# Patient Record
Sex: MALE | Race: WHITE | NOT HISPANIC OR LATINO | Employment: OTHER | URBAN - NONMETROPOLITAN AREA
[De-identification: names, ages, dates, MRNs, and addresses within clinical notes are randomized per-mention and may not be internally consistent; named-entity substitution may affect disease eponyms.]

---

## 2017-04-04 ENCOUNTER — OFFICE VISIT (OUTPATIENT)
Dept: CHIROPRACTIC MEDICINE | Facility: OTHER | Age: 72
End: 2017-04-04
Attending: CHIROPRACTOR
Payer: MEDICARE

## 2017-04-04 DIAGNOSIS — M99.02 SEGMENTAL AND SOMATIC DYSFUNCTION OF THORACIC REGION: ICD-10-CM

## 2017-04-04 DIAGNOSIS — M99.03 SEGMENTAL AND SOMATIC DYSFUNCTION OF LUMBAR REGION: Primary | ICD-10-CM

## 2017-04-04 DIAGNOSIS — M54.41 ACUTE RIGHT-SIDED LOW BACK PAIN WITH RIGHT-SIDED SCIATICA: ICD-10-CM

## 2017-04-04 DIAGNOSIS — M99.01 SEGMENTAL AND SOMATIC DYSFUNCTION OF CERVICAL REGION: ICD-10-CM

## 2017-04-04 PROCEDURE — 98941 CHIROPRACT MANJ 3-4 REGIONS: CPT | Mod: AT | Performed by: CHIROPRACTOR

## 2017-04-04 NOTE — MR AVS SNAPSHOT
"              After Visit Summary   4/4/2017    Nir Monson    MRN: [id number]           Patient Information     Date Of Birth          1945        Visit Information        Provider Department      4/4/2017 10:10 AM Antonio Briggs [facility]  [facility]        Today's Diagnoses     Segmental and somatic dysfunction of lumbar region    -  1    Acute right-sided low back pain with right-sided sciatica        Segmental and somatic dysfunction of thoracic region        Segmental and somatic dysfunction of cervical region           Follow-ups after your visit        Who to contact     If you have questions or need follow up information about today's clinic visit or your schedule please contact  [facility] directly at [phone].  Normal or non-critical lab and imaging results will be communicated to you by Channelinsighthart, letter or phone within 4 business days after the clinic has received the results. If you do not hear from us within 7 days, please contact the clinic through Channelinsighthart or phone. If you have a critical or abnormal lab result, we will notify you by phone as soon as possible.  Submit refill requests through [employer] or call your pharmacy and they will forward the refill request to us. Please allow 3 business days for your refill to be completed.          Additional Information About Your Visit        MyChart Information     [employer] lets you send messages to your doctor, view your test results, renew your prescriptions, schedule appointments and more. To sign up, go to [email or web address] . Click on \"Log in\" on the left side of the screen, which will take you to the Welcome page. Then click on \"Sign up Now\" on the right side of the page.     You will be asked to enter the access code listed below, as well as some personal information. Please follow the directions to create your username and password.     Your access code is: [email or web address]  Expires: 7/4/2017 10:12 AM     Your access code " will  in 90 days. If you need help or a new code, please call your [place] clinic or [phone].        Care EveryWhere ID     This is your Care EveryWhere ID. This could be used by other organizations to access your [place] medical records  [id number]         Blood Pressure from Last 3 Encounters:   16 140/78    Weight from Last 3 Encounters:   16 180 lb (81.6 kg)              We Performed the Following     CHIROPRAC MANIP,SPINAL,3-4 REGIONS        Primary Care Provider Office Phone # Fax #    Kitty Bell [phone]       [facility] [address] [facility] [address]        Thank you!     Thank you for choosing  [facility]  for your care. Our goal is always to provide you with excellent care. Hearing back from our patients is one way we can continue to improve our services. Please take a few minutes to complete the written survey that you may receive in the mail after your visit with us. Thank you!             Your Updated Medication List - Protect others around you: Learn how to safely use, store and throw away your medicines at www.disposemymeds.org.          This list is accurate as of: 17 11:59 PM.  Always use your most recent med list.                   Brand Name Dispense Instructions for use    LIPITOR PO      Take 10 mg by mouth daily       LISINOPRIL PO      Take 10 mg by mouth daily

## 2017-04-05 NOTE — PROGRESS NOTES
Subjective Finding:    Chief compalint: Patient presents with:  Back Pain: right sided  Neck Pain: left sided  , Pain Scale: 5/10, Intensity: sharp, Duration: 6 months, Radiating: right leg.    Date of injury:     Activities that the pain restricts:   Home/household/hobbies/social activities: yes.  Work duties: no.  Sleep: no.  Makes symptoms better: rest.  Makes symptoms worse: activity and golf.  Have you seen anyone else for the symptoms? No.  Work related: no.  Automobile related injury: no.    Objective and Assessment:    Posture Analysis:   High shoulder: .  Head tilt: .  High iliac crest: right.  Head carriage: forward.  Thoracic Kyphosis: neutral.  Lumbar Lordosis: neutral.    Lumbar Range of Motion: extension decreased and right lateral flexion decreased.  Cervical Range of Motion: extension decreased.  Thoracic Range of Motion: extension decreased.  Extremity Range of Motion: .    Palpation:   Quad lumb: right, referred pain: no  Lev scapulae: sharp pain, referred pain: no    Segmental dysfunction pre-treatment and treatment area: C6, C7, T4, L4 and L5.    Assessment post-treatment:  Cervical: ROM increased.  Thoracic: ROM increased.  Lumbar: ROM increased.    Comments: .      Complicating Factors: .    Procedure(s):  CMT:  79371 Chiropractic manipulative treatment 3-4 regions performed   Cervical: Diversified, See above for level, Supine, Thoracic: Diversified, See above for level, Prone and Lumbar: Diversified, See above for level, Side posture    Modalities:  None performed this visit    Therapeutic procedures:  None    Plan:  Treatment plan: PRN.  Instructed patient: stretch as instructed at visit.  Short term goals: increase ROM.  Long term goals: restore normal function.  Prognosis: very good.

## 2017-04-10 ENCOUNTER — OFFICE VISIT (OUTPATIENT)
Dept: CHIROPRACTIC MEDICINE | Facility: OTHER | Age: 72
End: 2017-04-10
Attending: CHIROPRACTOR
Payer: MEDICARE

## 2017-04-10 DIAGNOSIS — M54.41 ACUTE RIGHT-SIDED LOW BACK PAIN WITH RIGHT-SIDED SCIATICA: ICD-10-CM

## 2017-04-10 DIAGNOSIS — M99.03 SEGMENTAL AND SOMATIC DYSFUNCTION OF LUMBAR REGION: Primary | ICD-10-CM

## 2017-04-10 DIAGNOSIS — M99.02 SEGMENTAL AND SOMATIC DYSFUNCTION OF THORACIC REGION: ICD-10-CM

## 2017-04-10 PROCEDURE — 98940 CHIROPRACT MANJ 1-2 REGIONS: CPT | Mod: AT | Performed by: CHIROPRACTOR

## 2017-04-10 NOTE — MR AVS SNAPSHOT
"              After Visit Summary   4/10/2017    Nir Monson    MRN: [id number]           Patient Information     Date Of Birth          1945        Visit Information        Provider Department      4/10/2017 2:00 PM Antonio Briggs DC  [facility]        Today's Diagnoses     Segmental and somatic dysfunction of lumbar region    -  1    Acute right-sided low back pain with right-sided sciatica        Segmental and somatic dysfunction of thoracic region           Follow-ups after your visit        Who to contact     If you have questions or need follow up information about today's clinic visit or your schedule please contact  [facility] directly at [phone].  Normal or non-critical lab and imaging results will be communicated to you by Dialogfeedhart, letter or phone within 4 business days after the clinic has received the results. If you do not hear from us within 7 days, please contact the clinic through Dialogfeedhart or phone. If you have a critical or abnormal lab result, we will notify you by phone as soon as possible.  Submit refill requests through [employer] or call your pharmacy and they will forward the refill request to us. Please allow 3 business days for your refill to be completed.          Additional Information About Your Visit        MyChart Information     [employer] lets you send messages to your doctor, view your test results, renew your prescriptions, schedule appointments and more. To sign up, go to [email or web address] . Click on \"Log in\" on the left side of the screen, which will take you to the Welcome page. Then click on \"Sign up Now\" on the right side of the page.     You will be asked to enter the access code listed below, as well as some personal information. Please follow the directions to create your username and password.     Your access code is: [email or web address]  Expires: 2017 10:12 AM     Your access code will  in 90 days. If you need help or a new code, " please call your [place] clinic or [phone].        Care EveryWhere ID     This is your Care EveryWhere ID. This could be used by other organizations to access your [place] medical records  [id number]         Blood Pressure from Last 3 Encounters:   06/22/16 140/78    Weight from Last 3 Encounters:   06/22/16 180 lb (81.6 kg)              We Performed the Following     CHIROPRAC MANIP,SPINAL,1-2 REGIONS        Primary Care Provider Office Phone # Fax #    Kitty Bell [phone]       [facility] [address] [facility] [address]        Thank you!     Thank you for choosing  [facility]  for your care. Our goal is always to provide you with excellent care. Hearing back from our patients is one way we can continue to improve our services. Please take a few minutes to complete the written survey that you may receive in the mail after your visit with us. Thank you!             Your Updated Medication List - Protect others around you: Learn how to safely use, store and throw away your medicines at www.disposemymeds.org.          This list is accurate as of: 4/10/17 11:59 PM.  Always use your most recent med list.                   Brand Name Dispense Instructions for use    LIPITOR PO      Take 10 mg by mouth daily       LISINOPRIL PO      Take 10 mg by mouth daily

## 2017-04-13 NOTE — PROGRESS NOTES
Subjective Finding:    Chief compalint: Patient presents with:  Back Pain: right leg pain  , Pain Scale: 5/10, Intensity: sharp, Duration: 6 months, Radiating: right leg.    Date of injury:     Activities that the pain restricts:   Home/household/hobbies/social activities: yes.  Work duties: no.  Sleep: no.  Makes symptoms better: rest.  Makes symptoms worse: activity and golf.  Have you seen anyone else for the symptoms? No.  Work related: no.  Automobile related injury: no.    Objective and Assessment:    Posture Analysis:   High shoulder: .  Head tilt: .  High iliac crest: right.  Head carriage: forward.  Thoracic Kyphosis: neutral.  Lumbar Lordosis: neutral.    Lumbar Range of Motion: extension decreased and right lateral flexion decreased.  Cervical Range of Motion: extension decreased.  Thoracic Range of Motion: extension decreased.  Extremity Range of Motion: .    Palpation:   Quad lumb: right, referred pain: no  Lev scapulae: sharp pain, referred pain: no    Segmental dysfunction pre-treatment and treatment area: T5  L4-5.    Assessment post-treatment:  Cervical: ROM increased.  Thoracic: ROM increased.  Lumbar: ROM increased.    Comments: .      Complicating Factors: .    Procedure(s):  CMT:  27576 Chiropractic manipulative treatment 3-4 regions performed   Cervical: Diversified, See above for level, Supine, Thoracic: Diversified, See above for level, Prone and Lumbar: Diversified, See above for level, Side posture    Modalities:  None performed this visit    Therapeutic procedures:  None    Plan:  Treatment plan: PRN.  Instructed patient: stretch as instructed at visit.  Short term goals: increase ROM.  Long term goals: restore normal function.  Prognosis: very good.

## 2017-07-26 ENCOUNTER — TRANSFERRED RECORDS (OUTPATIENT)
Dept: HEALTH INFORMATION MANAGEMENT | Facility: HOSPITAL | Age: 72
End: 2017-07-26

## 2017-07-26 DIAGNOSIS — M48.062 SPINAL STENOSIS OF LUMBAR REGION WITH NEUROGENIC CLAUDICATION: Primary | ICD-10-CM

## 2017-07-26 DIAGNOSIS — M48.00 SPINAL STENOSIS: Primary | ICD-10-CM

## 2017-08-02 ENCOUNTER — HOSPITAL ENCOUNTER (OUTPATIENT)
Dept: CT IMAGING | Facility: HOSPITAL | Age: 72
End: 2017-08-02
Attending: FAMILY MEDICINE
Payer: MEDICARE

## 2017-08-02 ENCOUNTER — HOSPITAL ENCOUNTER (OUTPATIENT)
Facility: HOSPITAL | Age: 72
Discharge: HOME | End: 2017-08-02
Attending: FAMILY MEDICINE | Admitting: FAMILY MEDICINE
Payer: MEDICARE

## 2017-08-02 ENCOUNTER — SURGERY (OUTPATIENT)
Age: 72
End: 2017-08-02

## 2017-08-02 ENCOUNTER — HOSPITAL ENCOUNTER (OUTPATIENT)
Dept: INTERVENTIONAL RADIOLOGY/VASCULAR | Facility: HOSPITAL | Age: 72
End: 2017-08-02
Attending: FAMILY MEDICINE
Payer: MEDICARE

## 2017-08-02 VITALS
RESPIRATION RATE: 16 BRPM | DIASTOLIC BLOOD PRESSURE: 72 MMHG | SYSTOLIC BLOOD PRESSURE: 118 MMHG | OXYGEN SATURATION: 100 %

## 2017-08-02 PROCEDURE — 71000027 ZZH RECOVERY PHASE 2 EACH 15 MINS

## 2017-08-02 PROCEDURE — 25000125 ZZHC RX 250: Performed by: RADIOLOGY

## 2017-08-02 PROCEDURE — 72131 CT LUMBAR SPINE W/O DYE: CPT | Mod: TC

## 2017-08-02 PROCEDURE — 62304 MYELOGRAPHY LUMBAR INJECTION: CPT | Mod: TC

## 2017-08-02 RX ORDER — LIDOCAINE HYDROCHLORIDE 10 MG/ML
INJECTION, SOLUTION EPIDURAL; INFILTRATION; INTRACAUDAL; PERINEURAL
Status: DISPENSED
Start: 2017-08-02 | End: 2017-08-02

## 2017-08-02 RX ORDER — IOPAMIDOL 408 MG/ML
10 INJECTION, SOLUTION INTRATHECAL ONCE
Status: COMPLETED | OUTPATIENT
Start: 2017-08-02 | End: 2017-08-02

## 2017-08-02 RX ADMIN — LIDOCAINE HYDROCHLORIDE 3 ML: 10 INJECTION, SOLUTION EPIDURAL; INFILTRATION; INTRACAUDAL; PERINEURAL at 09:10

## 2017-08-02 RX ADMIN — IOPAMIDOL 10 ML: 408 INJECTION, SOLUTION INTRATHECAL at 09:11

## 2017-08-02 NOTE — IP AVS SNAPSHOT
MRN:[id number]                      After Visit Summary   8/2/2017    Nir Monson    MRN: [id number]           Thank you!     Thank you for choosing [place] for your care. Our goal is always to provide you with excellent care. Hearing back from our patients is one way we can continue to improve our services. Please take a few minutes to complete the written survey that you may receive in the mail after you visit with us. Thank you!        Patient Information     Date Of Birth          1945        About your hospital stay     You were admitted on:  August 2, 2017 You last received care in the:  [facility] Preop/Phase II    You were discharged on:  August 2, 2017       Who to Call     For medical emergencies, please call 911.  For non-urgent questions about your medical care, please call your primary care provider or clinic, [phone]  For questions related to your surgery, please call your surgery clinic        Attending Provider     Provider Specialty    Kitty Bell MD Family Practice       Primary Care Provider Office Phone # Fax #    Kitty Bell [phone]      Your next 10 appointments already scheduled     Aug 15, 2017  9:30 AM CDT   Pulmonary Function with [facility] PULMONARY LAB   [facility] Respiratory Therapy ([facility] )    [address] [place] [id number] [phone]           No Inhalers for 6 hours prior to test No Smoking 2 hours prior to test              Further instructions from your care team             DISCHARGE INSTRUCTIONS  Myelogram      IMPORTANT: As you prepare for discharge, the following information will help you return to your best level of health.               This Information Is About Your Follow Up Care  Call your doctor if you do not get better. Call sooner if you feel worse. You can reach your doctor by calling their clinic phone number.                                    This Information Is About  Procedures    MYELOGRAM.  In this procedure, a dye was injected through a hollow needle into the space around your spinal cord. An X-ray was then taken to see if you had any spinal problems. This will help diagnose the cause of your back pain.    Do the following:    Drink 8-10 glasses of fluids for the next 2 days unless your doctor has limited your fluids.    Slowly return to your normal activity.    Remain in bed with your head elevated 30 degrees until the following morning to prevent headaches from occuring.    No strenuous activity, heavy lifting or bending for the next 24 hours after your myelogram.      The following day you may resume your regular work/activity schedule if you feel alright.    You need someone to drive you home and stay with you for 24 hours.    Contact your doctor for your test results in 1 to 2 days.    Call your doctor if you have:    a severe headache.    any trouble moving your legs or walking.    any new problems or concerns.    fever and chills    nausea or vomiting                        This Information Is About Your Illness and Diagnosis    GENERAL BACK PAIN.  The back is prone to injury. Back pain can be caused by strains and injuries. It can involve the muscles, ligaments or bones. Today's exam did not show any obvious sign of bone (spine) or nerve damage.    Follow these instructions:    Rest more than usual for the next few days.    Apply a heating pad to your back for no longer than 30 minutes three to four times a day.    Take pain medicine as prescribed by the doctor.    Once the pain has lessened, resume your normal activities.    Do not lift heavy objects until the pain is gone.    Avoid any activity that causes pain.    Call your doctor if you:    have numbness, weakness or tingling in their fingers, arms or legs.    are not completely recovered in 2 weeks.    have any new or severe symptoms.                                                                                  "                           Pending Results     No orders found from 2017 to 8/3/2017.            Admission Information     Date & Time Provider Department Dept. Phone    2017 Kitty Bell MD HI Preop/Phase -[phone]      NanoSight Information     [employer] lets you send messages to your doctor, view your test results, renew your prescriptions, schedule appointments and more. To sign up, go to [email or web address] [facility] [employer] . Click on \"Log in\" on the left side of the screen, which will take you to the Welcome page. Then click on \"Sign up Now\" on the right side of the page.     You will be asked to enter the access code listed below, as well as some personal information. Please follow the directions to create your username and password.     Your access code is: [email or web address]  Expires: 10/31/2017  9:38 AM     Your access code will  in 90 days. If you need help or a new code, please call your [place] clinic or [phone].        Care EveryWhere ID     This is your Care EveryWhere ID. This could be used by other organizations to access your [place] medical records  [id number]        Equal Access to Services     ROBERTO ASHER AH: Mandy langleyo Sodustin, waaxda lualladaha, qaybta kaalmada adejeronimoyada, logan overton. So [facility] [phone].    ATENCIÓN: Si habla español, tiene a leram disposición servicios gratuitos de asistencia lingüística. Llame al [phone].    We comply with applicable federal civil rights laws and [place] laws. We do not discriminate on the basis of race, color, national origin, age, disability sex, sexual orientation or gender identity.               Review of your medicines      UNREVIEWED medicines. Ask your doctor about these medicines        Dose / Directions    LIPITOR PO        Dose:  10 mg   Take 10 mg by mouth daily   Refills:  0       LISINOPRIL PO        Dose:  10 mg   Take 10 mg by mouth daily   Refills:  0                Protect others " around you: Learn how to safely use, store and throw away your medicines at www.disposemymeds.org.             Medication List: This is a list of all your medications and when to take them. Check marks below indicate your daily home schedule. Keep this list as a reference.      Medications           Morning Afternoon Evening Bedtime As Needed    LIPITOR PO   Take 10 mg by mouth daily                                LISINOPRIL PO   Take 10 mg by mouth daily

## 2017-08-02 NOTE — DISCHARGE INSTRUCTIONS
DISCHARGE INSTRUCTIONS  Myelogram      IMPORTANT: As you prepare for discharge, the following information will help you return to your best level of health.               This Information Is About Your Follow Up Care  Call your doctor if you do not get better. Call sooner if you feel worse. You can reach your doctor by calling their clinic phone number.                                    This Information Is About Procedures    MYELOGRAM.  In this procedure, a dye was injected through a hollow needle into the space around your spinal cord. An X-ray was then taken to see if you had any spinal problems. This will help diagnose the cause of your back pain.    Do the following:    Drink 8-10 glasses of fluids for the next 2 days unless your doctor has limited your fluids.    Slowly return to your normal activity.    Remain in bed with your head elevated 30 degrees until the following morning to prevent headaches from occuring.    No strenuous activity, heavy lifting or bending for the next 24 hours after your myelogram.      The following day you may resume your regular work/activity schedule if you feel alright.    You need someone to drive you home and stay with you for 24 hours.    Contact your doctor for your test results in 1 to 2 days.    Call your doctor if you have:    a severe headache.    any trouble moving your legs or walking.    any new problems or concerns.    fever and chills    nausea or vomiting                        This Information Is About Your Illness and Diagnosis    GENERAL BACK PAIN.  The back is prone to injury. Back pain can be caused by strains and injuries. It can involve the muscles, ligaments or bones. Today's exam did not show any obvious sign of bone (spine) or nerve damage.    Follow these instructions:    Rest more than usual for the next few days.    Apply a heating pad to your back for no longer than 30 minutes three to four times a day.    Take pain medicine as prescribed by  the doctor.    Once the pain has lessened, resume your normal activities.    Do not lift heavy objects until the pain is gone.    Avoid any activity that causes pain.    Call your doctor if you:    have numbness, weakness or tingling in their fingers, arms or legs.    are not completely recovered in 2 weeks.    have any new or severe symptoms.

## 2017-08-02 NOTE — OR NURSING
Pt into SDS post myelogram, denies headache or pain, puncture site cover with bandaid clean dry and intact

## 2017-08-02 NOTE — PROGRESS NOTES
Fluoro time for this case was 1 minutes:05seconds, less than 5 min  Was patient held? NO  If yes, by whom? NA

## 2017-08-02 NOTE — PROGRESS NOTES
Procedure: lumbar myelogram    Radiologist:Dr. Catalan    There were no complicationsand patient has no symptoms..      Sedation:None. Local Anesthestic used  No sedation    Complications: None    Condition: Stable    Post-procedure pain score as of today is: 0    See dictated procedure note for full details and results.    Lo Kirkpatrick

## 2017-08-02 NOTE — IP AVS SNAPSHOT
HI Preop/Phase II    [address] [address]    Phone:  [phone]                                       After Visit Summary   8/2/2017    Nir Monson    MRN: [id number]           After Visit Summary Signature Page     I have received my discharge instructions, and my questions have been answered. I have discussed any challenges I see with this plan with the nurse or doctor.    ..........................................................................................................................................  Patient/Patient Representative Signature      ..........................................................................................................................................  Patient Representative Print Name and Relationship to Patient    ..................................................               ................................................  Date                                            Time    ..........................................................................................................................................  Reviewed by Signature/Title    ...................................................              ..............................................  Date                                                            Time

## 2017-08-03 ENCOUNTER — TELEPHONE (OUTPATIENT)
Dept: INTERVENTIONAL RADIOLOGY/VASCULAR | Facility: HOSPITAL | Age: 72
End: 2017-08-03

## 2017-08-15 ENCOUNTER — TRANSFERRED RECORDS (OUTPATIENT)
Dept: HEALTH INFORMATION MANAGEMENT | Facility: HOSPITAL | Age: 72
End: 2017-08-15

## 2017-08-15 ENCOUNTER — HOSPITAL ENCOUNTER (OUTPATIENT)
Dept: RESPIRATORY THERAPY | Facility: HOSPITAL | Age: 72
Discharge: HOME | End: 2017-08-15
Attending: INTERNAL MEDICINE | Admitting: INTERNAL MEDICINE
Payer: MEDICARE

## 2017-08-15 LAB
BASE DEFICIT BLDA-SCNC: 2.3 MMOL/L
COHGB MFR BLD: 1.4 % (ref 0–2)
HCO3 BLD-SCNC: 21 MMOL/L (ref 21–28)
HGB BLD-MCNC: 13.3 G/DL (ref 13.3–17.7)
O2/TOTAL GAS SETTING VFR VENT: ABNORMAL %
OXYHGB MFR BLD: 96 % (ref 92–100)
PCO2 BLD: 33 MM HG (ref 35–45)
PH BLD: 7.42 PH (ref 7.35–7.45)
PO2 BLD: 82 MM HG (ref 80–105)

## 2017-08-15 PROCEDURE — 94729 DIFFUSING CAPACITY: CPT

## 2017-08-15 PROCEDURE — 82805 BLOOD GASES W/O2 SATURATION: CPT | Performed by: FAMILY MEDICINE

## 2017-08-15 PROCEDURE — 25000125 ZZHC RX 250: Performed by: FAMILY MEDICINE

## 2017-08-15 PROCEDURE — 94060 EVALUATION OF WHEEZING: CPT | Mod: 26 | Performed by: INTERNAL MEDICINE

## 2017-08-15 PROCEDURE — 94726 PLETHYSMOGRAPHY LUNG VOLUMES: CPT

## 2017-08-15 PROCEDURE — 94726 PLETHYSMOGRAPHY LUNG VOLUMES: CPT | Mod: 26 | Performed by: INTERNAL MEDICINE

## 2017-08-15 PROCEDURE — 94060 EVALUATION OF WHEEZING: CPT

## 2017-08-15 PROCEDURE — 82375 ASSAY CARBOXYHB QUANT: CPT | Performed by: FAMILY MEDICINE

## 2017-08-15 PROCEDURE — 94729 DIFFUSING CAPACITY: CPT | Mod: 26 | Performed by: INTERNAL MEDICINE

## 2017-08-15 PROCEDURE — 85018 HEMOGLOBIN: CPT | Performed by: FAMILY MEDICINE

## 2017-08-15 RX ORDER — ALBUTEROL SULFATE 0.83 MG/ML
2.5 SOLUTION RESPIRATORY (INHALATION)
Status: COMPLETED | OUTPATIENT
Start: 2017-08-15 | End: 2017-08-15

## 2017-08-15 RX ADMIN — ALBUTEROL SULFATE 2.5 MG: 2.5 SOLUTION RESPIRATORY (INHALATION) at 10:22

## 2018-08-29 ENCOUNTER — TRANSFERRED RECORDS (OUTPATIENT)
Dept: HEALTH INFORMATION MANAGEMENT | Facility: CLINIC | Age: 73
End: 2018-08-29

## 2018-11-01 ENCOUNTER — TRANSFERRED RECORDS (OUTPATIENT)
Dept: HEALTH INFORMATION MANAGEMENT | Facility: HOSPITAL | Age: 73
End: 2018-11-01

## 2018-11-14 ENCOUNTER — ANESTHESIA EVENT (OUTPATIENT)
Dept: SURGERY | Facility: HOSPITAL | Age: 73
End: 2018-11-14
Payer: MEDICARE

## 2018-11-14 ASSESSMENT — COPD QUESTIONNAIRES: COPD: 1

## 2018-11-14 ASSESSMENT — LIFESTYLE VARIABLES: TOBACCO_USE: 1

## 2018-11-14 NOTE — ANESTHESIA PREPROCEDURE EVALUATION
Anesthesia Evaluation     . Pt has had prior anesthetic.     No history of anesthetic complications          ROS/MED HX    ENT/Pulmonary:     (+)tobacco use, Current use 0.75 PPD packs/day  Moderate Persistent asthma COPD, , . .    Neurologic:  - neg neurologic ROS     Cardiovascular:     (+) Dyslipidemia, ----. : . . . pacemaker :. valvular problems/murmurs murmur:.       METS/Exercise Tolerance:     Hematologic:         Musculoskeletal: Comment: LDD        GI/Hepatic:  - neg GI/hepatic ROS       Renal/Genitourinary: Comment: Prostate ca        Endo:  - neg endo ROS       Psychiatric:         Infectious Disease:  - neg infectious disease ROS       Malignancy:   (+) Malignancy History of Prostate          Other:                     Physical Exam      Airway   Mallampati: III  TM distance: >3 FB  Neck ROM: full    Dental   (+) chipped    Cardiovascular   Rhythm and rate: regular  (+) murmur     PE comment: 2-3/6 Murmur    Pulmonary    breath sounds clear to auscultation                    Anesthesia Plan      History & Physical Review  History and physical reviewed and following examination; no interval change.    ASA Status:  3 .    NPO Status:  > 8 hours    Plan for MAC with Other induction. Maintenance will be Other.  Reason for MAC:  Procedure to face, neck, head or breast, Chronic cardiopulmonary disease (G9) and Deep or markedly invasive procedure (G8)  PONV prophylaxis:  Ondansetron (or other 5HT-3)       Postoperative Care  Postoperative pain management:  IV analgesics and Oral pain medications.      Consents  Anesthetic plan, risks, benefits and alternatives discussed with:  Patient..                          .

## 2018-11-15 ENCOUNTER — HOSPITAL ENCOUNTER (OUTPATIENT)
Facility: HOSPITAL | Age: 73
Discharge: HOME | End: 2018-11-15
Attending: OPHTHALMOLOGY | Admitting: OPHTHALMOLOGY
Payer: MEDICARE

## 2018-11-15 ENCOUNTER — ANESTHESIA (OUTPATIENT)
Dept: SURGERY | Facility: HOSPITAL | Age: 73
End: 2018-11-15
Payer: MEDICARE

## 2018-11-15 VITALS
RESPIRATION RATE: 16 BRPM | DIASTOLIC BLOOD PRESSURE: 71 MMHG | OXYGEN SATURATION: 97 % | SYSTOLIC BLOOD PRESSURE: 131 MMHG | HEIGHT: 68 IN | BODY MASS INDEX: 27.58 KG/M2 | TEMPERATURE: 97.4 F | WEIGHT: 182 LBS

## 2018-11-15 PROCEDURE — 25000128 H RX IP 250 OP 636: Performed by: OPHTHALMOLOGY

## 2018-11-15 PROCEDURE — 27210794 ZZH OR GENERAL SUPPLY STERILE: Performed by: OPHTHALMOLOGY

## 2018-11-15 PROCEDURE — 40000306 ZZH STATISTIC PRE PROC ASSESS II: Performed by: OPHTHALMOLOGY

## 2018-11-15 PROCEDURE — V2632 POST CHMBR INTRAOCULAR LENS: HCPCS | Performed by: OPHTHALMOLOGY

## 2018-11-15 PROCEDURE — 25000125 ZZHC RX 250: Performed by: OPHTHALMOLOGY

## 2018-11-15 PROCEDURE — 99100 ANES PT EXTEME AGE<1 YR&>70: CPT | Performed by: NURSE ANESTHETIST, CERTIFIED REGISTERED

## 2018-11-15 PROCEDURE — 66984 XCAPSL CTRC RMVL W/O ECP: CPT | Performed by: ANESTHESIOLOGY

## 2018-11-15 PROCEDURE — 37000008 ZZH ANESTHESIA TECHNICAL FEE, 1ST 30 MIN: Performed by: OPHTHALMOLOGY

## 2018-11-15 PROCEDURE — 71000027 ZZH RECOVERY PHASE 2 EACH 15 MINS: Performed by: OPHTHALMOLOGY

## 2018-11-15 PROCEDURE — C9447 INJ, PHENYLEPHRINE KETOROLAC: HCPCS | Performed by: OPHTHALMOLOGY

## 2018-11-15 PROCEDURE — 66984 XCAPSL CTRC RMVL W/O ECP: CPT | Performed by: NURSE ANESTHETIST, CERTIFIED REGISTERED

## 2018-11-15 PROCEDURE — 36000056 ZZH SURGERY LEVEL 3 1ST 30 MIN: Performed by: OPHTHALMOLOGY

## 2018-11-15 PROCEDURE — 25000128 H RX IP 250 OP 636: Performed by: NURSE ANESTHETIST, CERTIFIED REGISTERED

## 2018-11-15 DEVICE — LENS-TECNIS PCB00 19.5 PRELOADED: Type: IMPLANTABLE DEVICE | Site: EYE | Status: FUNCTIONAL

## 2018-11-15 RX ORDER — PREDNISOLONE ACETATE 10 MG/ML
SUSPENSION/ DROPS OPHTHALMIC PRN
Status: DISCONTINUED | OUTPATIENT
Start: 2018-11-15 | End: 2018-11-15 | Stop reason: HOSPADM

## 2018-11-15 RX ORDER — PHENYLEPHRINE HYDROCHLORIDE 100 MG/ML
1 SOLUTION/ DROPS OPHTHALMIC ONCE
Status: COMPLETED | OUTPATIENT
Start: 2018-11-15 | End: 2018-11-15

## 2018-11-15 RX ORDER — PHENYLEPHRINE HYDROCHLORIDE 100 MG/ML
SOLUTION/ DROPS OPHTHALMIC
Status: DISCONTINUED
Start: 2018-11-15 | End: 2018-11-15 | Stop reason: HOSPADM

## 2018-11-15 RX ORDER — ONDANSETRON 4 MG/1
4 TABLET, ORALLY DISINTEGRATING ORAL EVERY 30 MIN PRN
Status: DISCONTINUED | OUTPATIENT
Start: 2018-11-15 | End: 2018-11-15 | Stop reason: HOSPADM

## 2018-11-15 RX ORDER — OFLOXACIN 3 MG/ML
1 SOLUTION/ DROPS OPHTHALMIC ONCE
Status: COMPLETED | OUTPATIENT
Start: 2018-11-15 | End: 2018-11-15

## 2018-11-15 RX ORDER — SODIUM CHLORIDE, SODIUM LACTATE, POTASSIUM CHLORIDE, CALCIUM CHLORIDE 600; 310; 30; 20 MG/100ML; MG/100ML; MG/100ML; MG/100ML
INJECTION, SOLUTION INTRAVENOUS CONTINUOUS
Status: DISCONTINUED | OUTPATIENT
Start: 2018-11-15 | End: 2018-11-15 | Stop reason: HOSPADM

## 2018-11-15 RX ORDER — ONDANSETRON 2 MG/ML
4 INJECTION INTRAMUSCULAR; INTRAVENOUS EVERY 30 MIN PRN
Status: DISCONTINUED | OUTPATIENT
Start: 2018-11-15 | End: 2018-11-15 | Stop reason: HOSPADM

## 2018-11-15 RX ORDER — CYCLOPENTOLATE HYDROCHLORIDE 10 MG/ML
1 SOLUTION/ DROPS OPHTHALMIC
Status: COMPLETED | OUTPATIENT
Start: 2018-11-15 | End: 2018-11-15

## 2018-11-15 RX ORDER — TETRACAINE HYDROCHLORIDE 5 MG/ML
SOLUTION OPHTHALMIC PRN
Status: DISCONTINUED | OUTPATIENT
Start: 2018-11-15 | End: 2018-11-15 | Stop reason: HOSPADM

## 2018-11-15 RX ORDER — OFLOXACIN 3 MG/ML
SOLUTION/ DROPS OPHTHALMIC PRN
Status: DISCONTINUED | OUTPATIENT
Start: 2018-11-15 | End: 2018-11-15 | Stop reason: HOSPADM

## 2018-11-15 RX ORDER — PHENYLEPHRINE HYDROCHLORIDE 25 MG/ML
1 SOLUTION/ DROPS OPHTHALMIC
Status: COMPLETED | OUTPATIENT
Start: 2018-11-15 | End: 2018-11-15

## 2018-11-15 RX ORDER — PROPARACAINE HYDROCHLORIDE 5 MG/ML
1 SOLUTION/ DROPS OPHTHALMIC ONCE
Status: COMPLETED | OUTPATIENT
Start: 2018-11-15 | End: 2018-11-15

## 2018-11-15 RX ADMIN — MIDAZOLAM 1 MG: 1 INJECTION INTRAMUSCULAR; INTRAVENOUS at 10:38

## 2018-11-15 RX ADMIN — OFLOXACIN 1 DROP: 3 SOLUTION/ DROPS OPHTHALMIC at 09:22

## 2018-11-15 RX ADMIN — CYCLOPENTOLATE HYDROCHLORIDE 1 DROP: 10 SOLUTION/ DROPS OPHTHALMIC at 09:21

## 2018-11-15 RX ADMIN — PHENYLEPHRINE HYDROCHLORIDE 1 DROP: 100 SOLUTION/ DROPS OPHTHALMIC at 09:48

## 2018-11-15 RX ADMIN — FLURBIPROFEN SODIUM 0.5 ML: 0.3 SOLUTION/ DROPS OPHTHALMIC at 09:27

## 2018-11-15 RX ADMIN — CYCLOPENTOLATE HYDROCHLORIDE 1 DROP: 10 SOLUTION/ DROPS OPHTHALMIC at 09:26

## 2018-11-15 RX ADMIN — PHENYLEPHRINE HYDROCHLORIDE 1 DROP: 25 SOLUTION/ DROPS OPHTHALMIC at 09:26

## 2018-11-15 RX ADMIN — PROPARACAINE HYDROCHLORIDE 1 DROP: 5 SOLUTION/ DROPS OPHTHALMIC at 09:20

## 2018-11-15 RX ADMIN — PHENYLEPHRINE HYDROCHLORIDE 1 DROP: 25 SOLUTION/ DROPS OPHTHALMIC at 09:21

## 2018-11-15 NOTE — IP AVS SNAPSHOT
MRN:[id number]                      After Visit Summary   11/15/2018    Nir Monson    MRN: [id number]           Thank you!     Thank you for choosing [facility] for your care. Our goal is always to provide you with excellent care. Hearing back from our patients is one way we can continue to improve our services. Please take a few minutes to complete the written survey that you may receive in the mail after you visit with us. Thank you!        Patient Information     Date Of Birth          1945        About your hospital stay     You were admitted on:  November 15, 2018 You last received care in the:  [facility] Preop/Phase II    You were discharged on:  November 15, 2018       Who to Call     For medical emergencies, please call 911.  For non-urgent questions about your medical care, please call your primary care provider or clinic, [phone]  For questions related to your surgery, please call your surgery clinic        Attending Provider     Provider Specialty    Neil Berry MD Ophthalmology       Primary Care Provider Office Phone # Fax #    Kitty MATIAS MD [place] [phone]      Your next 10 appointments already scheduled     Nov 29, 2018   Procedure with Neil Berry MD   [facility] Periop Services ([facility] )    [address] [place] [address] [id number] [phone]              Further instructions from your care team       AFTER CATARACT SURGERY RESTRICTIONS    SHIELD: WEAR OVER SURGICAL EYE AT NIGHT FOR 1 WEEK    ACTIVITY/LIFTING: Avoid activities, which increase abdominal/head pressure such as pulling on a starter cord, heavy lifting (over 15-20 lbs) or bending with the head below the waist, for 1 week. Avoid sudden jerky movements (chopping, shoveling) for 1 week. Waking and lower body exercise such as biking is okay.     WATER: Showering/washing hair is okay the day after surgery, but avoid excess water, soap, or shampoo in your eyes. Clean eyelids  gently with a clean, wet washcloth as needed. Avoid pressure on the eye.     SUNLIGHT: If the eye is light sensitive after surgery, dark glasses may be worn.     DIET/MEDICATIONS: Resume you usual diet and medications your family doctor ehas prescribed. Continue to use/follow the instructions for your eye drops.     DRIVING: Avoid driving with a patch. Resume driving when you feel safe, but don't drive on the day of surgery.    PAIN: Minor pain and itching is normal during healing. DO NOT RUB THE EYE! Report any unusual swelling, increased discharge or pain that is not relieved by Tylenol (or equivalent). If sudden drop/change in vision call immediately. [facility] [phone]    CONTINUE TO USE ALL THE EYE DROPS PER THE INSTRUCTIONS ORDERED BY DR. THAO'S TECHNICIANS    FOR EMERGENCY DR. LANDRY: [phone]    FOLLOW EYE DROP INSTRUCTIONS GIVEN BY 's OFFICE      Post-Anesthesia Patient Instructions    IMMEDIATELY FOLLOWING SURGERY:  Do not drive or operate machinery for the first twenty four hours after surgery.  Do not make any important decisions for twenty four hours after surgery or while taking narcotic pain medications or sedatives.  If you develop intractable nausea and vomiting or a severe headache please notify your doctor immediately.    FOLLOW-UP:  Please make an appointment with your surgeon as instructed. You do not need to follow up with anesthesia unless specifically instructed to do so.    WOUND CARE INSTRUCTIONS (if applicable):  Keep a dry clean dressing on the anesthesia/puncture wound site if there is drainage.  Once the wound has quit draining you may leave it open to air.  Generally you should leave the bandage intact for twenty four hours unless there is drainage.  If the epidural site drains for more than 36-48 hours please call the anesthesia department.    QUESTIONS?:  Please feel free to call your physician or the hospital  if you have any questions, and they  "will be happy to assist you.       Pending Results     No orders found from 2018 to 2018.            Admission Information     Date & Time Provider Department Dept. Phone    11/15/2018 Neil Berry MD [facility] Preop/Phase [phone]      Your Vitals Were     Blood Pressure Temperature Respirations Height Weight Pulse Oximetry    150/73 97.4  F (36.3  C) (Oral) 16 1.721 m (5' 7.75\") 82.6 kg (182 lb) 98%    BMI (Body Mass Index)                   27.88 kg/m2           MyChart Information     [employer] lets you send messages to your doctor, view your test results, renew your prescriptions, schedule appointments and more. To sign up, go to [email or web address] . Click on \"Log in\" on the left side of the screen, which will take you to the Welcome page. Then click on \"Sign up Now\" on the right side of the page.     You will be asked to enter the access code listed below, as well as some personal information. Please follow the directions to create your username and password.     Your access code is: [email or web address]  Expires: 2019 11:09 AM     Your access code will  in 90 days. If you need help or a new code, please call your [facility] clinic or [phone].        Care EveryWhere ID     This is your Care EveryWhere ID. This could be used by other organizations to access your [facility] medical records  [id number]        Equal Access to Services     [facility] LAKESHIA : Hadii emile rdz hadasho Sokarmaali, waaxda luqadaha, qaybta kaalmada adeegyada, logan pringle . So [facility] [phone].    ATENCIÓN: Si habla español, tiene a lerma disposición servicios gratuitos de asistencia lingüística. Llame al [phone].    We comply with applicable federal civil rights laws and [place] laws. We do not discriminate on the basis of race, color, national origin, age, disability, sex, sexual orientation, or gender identity.               Review of your medicines      CONTINUE these medicines which " have NOT CHANGED        Dose / Directions    ASPIRIN NOT PRESCRIBED   Commonly known as:  INTENTIONAL        Dose:  300 each   300 each continuous prn for other Please choose reason not prescribed, below   Refills:  0       IBUPROFEN PO        Dose:  200 mg   Take 200 mg by mouth every 6 hours as needed for moderate pain   Refills:  0       LIPITOR PO        Dose:  20 mg   Take 20 mg by mouth daily   Refills:  0       LISINOPRIL PO        Dose:  10 mg   Take 10 mg by mouth daily   Refills:  0                Protect others around you: Learn how to safely use, store and throw away your medicines at www.disposemymeds.org.             Medication List: This is a list of all your medications and when to take them. Check marks below indicate your daily home schedule. Keep this list as a reference.      Medications           Morning Afternoon Evening Bedtime As Needed    ASPIRIN NOT PRESCRIBED   Commonly known as:  INTENTIONAL   300 each continuous prn for other Please choose reason not prescribed, below                                IBUPROFEN PO   Take 200 mg by mouth every 6 hours as needed for moderate pain                                LIPITOR PO   Take 20 mg by mouth daily                                LISINOPRIL PO   Take 10 mg by mouth daily

## 2018-11-15 NOTE — DISCHARGE INSTRUCTIONS
AFTER CATARACT SURGERY RESTRICTIONS    SHIELD: WEAR OVER SURGICAL EYE AT NIGHT FOR 1 WEEK    ACTIVITY/LIFTING: Avoid activities, which increase abdominal/head pressure such as pulling on a starter cord, heavy lifting (over 15-20 lbs) or bending with the head below the waist, for 1 week. Avoid sudden jerky movements (chopping, shoveling) for 1 week. Waking and lower body exercise such as biking is okay.     WATER: Showering/washing hair is okay the day after surgery, but avoid excess water, soap, or shampoo in your eyes. Clean eyelids gently with a clean, wet washcloth as needed. Avoid pressure on the eye.     SUNLIGHT: If the eye is light sensitive after surgery, dark glasses may be worn.     DIET/MEDICATIONS: Resume you usual diet and medications your family doctor ehas prescribed. Continue to use/follow the instructions for your eye drops.     DRIVING: Avoid driving with a patch. Resume driving when you feel safe, but don't drive on the day of surgery.    PAIN: Minor pain and itching is normal during healing. DO NOT RUB THE EYE! Report any unusual swelling, increased discharge or pain that is not relieved by Tylenol (or equivalent). If sudden drop/change in vision call immediately. [facility] [phone]    CONTINUE TO USE ALL THE EYE DROPS PER THE INSTRUCTIONS ORDERED BY DR. THAO'S TECHNICIANS    FOR EMERGENCY DR. LANDRY: [phone]    FOLLOW EYE DROP INSTRUCTIONS GIVEN BY 's OFFICE      Post-Anesthesia Patient Instructions    IMMEDIATELY FOLLOWING SURGERY:  Do not drive or operate machinery for the first twenty four hours after surgery.  Do not make any important decisions for twenty four hours after surgery or while taking narcotic pain medications or sedatives.  If you develop intractable nausea and vomiting or a severe headache please notify your doctor immediately.    FOLLOW-UP:  Please make an appointment with your surgeon as instructed. You do not need to follow up with anesthesia  unless specifically instructed to do so.    WOUND CARE INSTRUCTIONS (if applicable):  Keep a dry clean dressing on the anesthesia/puncture wound site if there is drainage.  Once the wound has quit draining you may leave it open to air.  Generally you should leave the bandage intact for twenty four hours unless there is drainage.  If the epidural site drains for more than 36-48 hours please call the anesthesia department.    QUESTIONS?:  Please feel free to call your physician or the hospital  if you have any questions, and they will be happy to assist you.

## 2018-11-15 NOTE — OP NOTE
DATE OF SERVICE: 11/15/18      PREOPERATIVE DIAGNOSIS:     Cataract, Right eye.       POSTOPERATIVE DIAGNOSIS:  Cataract, Right eye.       PROCEDURE:  Phacoemulsification with intraocular lens implant, Model PCB00, 19.5 diopter power.       ANESTHESIA:  Topical with IV sedation.         DESCRIPTION OF PROCEDURE:   Operative risks, benefits and alternatives to the procedure were discussed at length with the patient including loss of vision, loss of the eye.  Patient voiced understanding and informed consent was signed.  The patient was taken to the operating suite, where an appropriate level of anesthesia was given.  Attention was placed to the right eye.  The patient was then prepped and draped in the usual sterile ophthalmic manner.  A lid speculum was placed in the eye to provide exposure and MVR blade was used to make a paracentesis.  Once this was performed, Shugarcaine was then placed intracamerally.  This was then followed by intracameral Viscoat.  A 2.75 mm blade was then used to make a biplanar temporal clear corneal incision.  A cystotome and uttrata were used to make a continuous curvilinear capsulorrhexis.  BSS on Kitchen cannula was then used to hydrodissect the lens.  Phacoemulsification was then performed in a divide-and-conquer technique to remove all pieces of the nucleus.  Irrigation aspiration was then used to remove all remaining cortical material and debris.  Amvisc was then used to fill the capsular bag.  A PCB00 19.5 diopter lens was then injected into the capsular bag using the injector.  Once this was performed, a Goldberg secondary instrument was used to rotate the lens into proper position.  Irrigation aspiration was then used to remove all remaining viscoelastic material and center the lens appropriately.  BSS on 30 gauge cannula was then used to hydrate both wounds.  A Weck-Camila was used to assess intraocular IOP.  Once this was stable and the lens was found to be in good position, the  patient was undraped.  The patient was brought to the recovery area in stable condition.  Family was made aware of the patient's condition and the patient will follow up with us later this afternoon.  No complications.           Neil Berry MD

## 2018-11-15 NOTE — ANESTHESIA POSTPROCEDURE EVALUATION
Patient: Nir Monson    Procedure(s):  CATARACT EXTRACTION WITH IMPLANT RIGHT     Diagnosis:CATARACT RIGHT EYE  Diagnosis Additional Information: No value filed.    Anesthesia Type:  MAC    Note:  Anesthesia Post Evaluation    Patient location during evaluation: Phase 2 and Bedside  Patient participation: Able to fully participate in evaluation  Level of consciousness: awake and alert  Pain management: adequate  Airway patency: patent  Cardiovascular status: acceptable  Respiratory status: acceptable  Hydration status: stable  PONV: none     Anesthetic complications: None          Last vitals:  Vitals:    11/15/18 0916 11/15/18 1058 11/15/18 1100   BP: 161/81 158/76 150/73   Resp:  16 16   Temp: 97.4  F (36.3  C)     SpO2: 95% 98% 98%         Electronically Signed By: Soto Burciaga MD  November 15, 2018  11:10 AM

## 2018-11-15 NOTE — ANESTHESIA POSTPROCEDURE EVALUATION
Patient: Nir Monson    Procedure(s):  CATARACT EXTRACTION WITH IMPLANT RIGHT     Diagnosis:CATARACT RIGHT EYE  Diagnosis Additional Information: No value filed.    Anesthesia Type:  MAC    Note:  Anesthesia Post Evaluation    Patient location during evaluation: Phase 2  Patient participation: Able to fully participate in evaluation  Level of consciousness: awake and alert  Pain management: adequate  Airway patency: patent  Cardiovascular status: acceptable  Respiratory status: acceptable  Hydration status: acceptable  PONV: none             Last vitals:  Vitals:    11/15/18 1100 11/15/18 1110 11/15/18 1115   BP: 150/73 142/80 131/71   Resp: 16 16 16   Temp:      SpO2: 98% 97% 97%         Electronically Signed By: ROMINA Marquez CRNA  November 15, 2018  3:04 PM

## 2018-11-15 NOTE — OR NURSING
Patient and responsible adult given discharge instructions with no questions regarding instructions. Neil score 20. Pain level 0/10.  Discharged from unit via ambulation. Patient discharged to home.

## 2018-11-15 NOTE — IP AVS SNAPSHOT
[facility] Preop/Phase II    [address] [address]    Phone:  [phone]                                       After Visit Summary   11/15/2018    Nir Monson    MRN: [id number]           After Visit Summary Signature Page     I have received my discharge instructions, and my questions have been answered. I have discussed any challenges I see with this plan with the nurse or doctor.    ..........................................................................................................................................  Patient/Patient Representative Signature      ..........................................................................................................................................  Patient Representative Print Name and Relationship to Patient    ..................................................               ................................................  Date                                   Time    ..........................................................................................................................................  Reviewed by Signature/Title    ...................................................              ..............................................  Date                                               Time          22EPIC Rev 08/18

## 2018-11-15 NOTE — ANESTHESIA CARE TRANSFER NOTE
Patient: Nir Monson    Procedure(s):  CATARACT EXTRACTION WITH IMPLANT RIGHT     Diagnosis: CATARACT RIGHT EYE  Diagnosis Additional Information: No value filed.    Anesthesia Type:   MAC     Note:  Airway :Room Air  Patient transferred to:Phase II  Handoff Report: Identifed the Patient, Identified the Reponsible Provider, Reviewed the pertinent medical history, Discussed the surgical course, Reviewed Intra-OP anesthesia mangement and issues during anesthesia, Set expectations for post-procedure period and Allowed opportunity for questions and acknowledgement of understanding      Vitals: (Last set prior to Anesthesia Care Transfer)    CRNA VITALS  11/15/2018 1024 - 11/15/2018 1103      11/15/2018             Pulse: 60    Ht Rate: 60    SpO2: 100 %    Resp Rate (set): 8                Electronically Signed By: ROMINA Winkler CRNA  November 15, 2018  11:03 AM

## 2018-11-28 ENCOUNTER — ANESTHESIA EVENT (OUTPATIENT)
Dept: SURGERY | Facility: HOSPITAL | Age: 73
End: 2018-11-28
Payer: MEDICARE

## 2018-11-28 ASSESSMENT — LIFESTYLE VARIABLES: TOBACCO_USE: 1

## 2018-11-28 NOTE — ANESTHESIA PREPROCEDURE EVALUATION
Anesthesia Evaluation     . Pt has had prior anesthetic.     No history of anesthetic complications          ROS/MED HX    ENT/Pulmonary:     (+)tobacco use, Current use <1.0 PPD packs/day  Moderate Persistent asthma COPD, , . .    Neurologic:  - neg neurologic ROS     Cardiovascular:     (+) Dyslipidemia, ----. : . . . pacemaker :. valvular problems/murmurs .       METS/Exercise Tolerance:     Hematologic:  - neg hematologic  ROS       Musculoskeletal: Comment: Lumbar disc disease  (+) arthritis, , , -       GI/Hepatic:  - neg GI/hepatic ROS       Renal/Genitourinary:  - ROS Renal section negative       Endo:  - neg endo ROS       Psychiatric:  - neg psychiatric ROS       Infectious Disease:  - neg infectious disease ROS       Malignancy:   (+) Malignancy History of Prostate          Other:    - neg other ROS                 Physical Exam      Airway   Mallampati: III  TM distance: >3 FB  Neck ROM: full    Dental   (+) chipped and missing  Comment: Poor dentition     Cardiovascular   Rhythm and rate: regular and normal  (+) murmur     PE comment: 1-2/6 Blowing Murmur     Pulmonary    breath sounds clear to auscultation                    Anesthesia Plan      History & Physical Review  History and physical reviewed and following examination; no interval change.    ASA Status:  3 .    NPO Status:  > 8 hours    Plan for MAC with Other induction. Maintenance will be Other.  Reason for MAC:  Procedure to face, neck, head or breast, Chronic cardiopulmonary disease (G9) and Deep or markedly invasive procedure (G8)  PONV prophylaxis:  Ondansetron (or other 5HT-3)       Postoperative Care  Postoperative pain management:  IV analgesics.      Consents  Anesthetic plan, risks, benefits and alternatives discussed with:  Patient..                          .

## 2018-11-29 ENCOUNTER — HOSPITAL ENCOUNTER (OUTPATIENT)
Facility: HOSPITAL | Age: 73
Discharge: NURSING, REHAB OR LONG-TERM CARE | End: 2018-11-29
Attending: OPHTHALMOLOGY | Admitting: OPHTHALMOLOGY
Payer: MEDICARE

## 2018-11-29 ENCOUNTER — ANESTHESIA (OUTPATIENT)
Dept: SURGERY | Facility: HOSPITAL | Age: 73
End: 2018-11-29
Payer: MEDICARE

## 2018-11-29 VITALS
TEMPERATURE: 96.7 F | SYSTOLIC BLOOD PRESSURE: 129 MMHG | BODY MASS INDEX: 27.58 KG/M2 | WEIGHT: 182 LBS | DIASTOLIC BLOOD PRESSURE: 100 MMHG | HEIGHT: 68 IN | OXYGEN SATURATION: 98 %

## 2018-11-29 PROCEDURE — V2632 POST CHMBR INTRAOCULAR LENS: HCPCS | Performed by: OPHTHALMOLOGY

## 2018-11-29 PROCEDURE — 99100 ANES PT EXTEME AGE<1 YR&>70: CPT | Performed by: NURSE ANESTHETIST, CERTIFIED REGISTERED

## 2018-11-29 PROCEDURE — C9447 INJ, PHENYLEPHRINE KETOROLAC: HCPCS | Performed by: OPHTHALMOLOGY

## 2018-11-29 PROCEDURE — 37000009 ZZH ANESTHESIA TECHNICAL FEE, EACH ADDTL 15 MIN: Performed by: OPHTHALMOLOGY

## 2018-11-29 PROCEDURE — 71000027 ZZH RECOVERY PHASE 2 EACH 15 MINS: Performed by: OPHTHALMOLOGY

## 2018-11-29 PROCEDURE — 37000008 ZZH ANESTHESIA TECHNICAL FEE, 1ST 30 MIN: Performed by: OPHTHALMOLOGY

## 2018-11-29 PROCEDURE — 66984 XCAPSL CTRC RMVL W/O ECP: CPT | Performed by: ANESTHESIOLOGY

## 2018-11-29 PROCEDURE — 40000306 ZZH STATISTIC PRE PROC ASSESS II: Performed by: OPHTHALMOLOGY

## 2018-11-29 PROCEDURE — 36000058 ZZH SURGERY LEVEL 3 EA 15 ADDTL MIN: Performed by: OPHTHALMOLOGY

## 2018-11-29 PROCEDURE — 66984 XCAPSL CTRC RMVL W/O ECP: CPT | Performed by: NURSE ANESTHETIST, CERTIFIED REGISTERED

## 2018-11-29 PROCEDURE — 25000128 H RX IP 250 OP 636: Performed by: OPHTHALMOLOGY

## 2018-11-29 PROCEDURE — 25000125 ZZHC RX 250: Performed by: OPHTHALMOLOGY

## 2018-11-29 PROCEDURE — 27210794 ZZH OR GENERAL SUPPLY STERILE: Performed by: OPHTHALMOLOGY

## 2018-11-29 PROCEDURE — 36000056 ZZH SURGERY LEVEL 3 1ST 30 MIN: Performed by: OPHTHALMOLOGY

## 2018-11-29 PROCEDURE — 25000128 H RX IP 250 OP 636: Performed by: NURSE ANESTHETIST, CERTIFIED REGISTERED

## 2018-11-29 DEVICE — LENS-TECNIS PCB00 19.5 PRELOADED: Type: IMPLANTABLE DEVICE | Site: EYE | Status: FUNCTIONAL

## 2018-11-29 RX ORDER — PROPARACAINE HYDROCHLORIDE 5 MG/ML
1 SOLUTION/ DROPS OPHTHALMIC ONCE
Status: COMPLETED | OUTPATIENT
Start: 2018-11-29 | End: 2018-11-29

## 2018-11-29 RX ORDER — ONDANSETRON 4 MG/1
4 TABLET, ORALLY DISINTEGRATING ORAL EVERY 30 MIN PRN
Status: DISCONTINUED | OUTPATIENT
Start: 2018-11-29 | End: 2018-11-29 | Stop reason: HOSPADM

## 2018-11-29 RX ORDER — OFLOXACIN 3 MG/ML
1 SOLUTION/ DROPS OPHTHALMIC ONCE
Status: COMPLETED | OUTPATIENT
Start: 2018-11-29 | End: 2018-11-29

## 2018-11-29 RX ORDER — PHENYLEPHRINE HYDROCHLORIDE 100 MG/ML
1 SOLUTION/ DROPS OPHTHALMIC ONCE
Status: COMPLETED | OUTPATIENT
Start: 2018-11-29 | End: 2018-11-29

## 2018-11-29 RX ORDER — PREDNISOLONE ACETATE 10 MG/ML
SUSPENSION/ DROPS OPHTHALMIC PRN
Status: DISCONTINUED | OUTPATIENT
Start: 2018-11-29 | End: 2018-11-29 | Stop reason: HOSPADM

## 2018-11-29 RX ORDER — PHENYLEPHRINE HYDROCHLORIDE 25 MG/ML
1 SOLUTION/ DROPS OPHTHALMIC
Status: COMPLETED | OUTPATIENT
Start: 2018-11-29 | End: 2018-11-29

## 2018-11-29 RX ORDER — CYCLOPENTOLATE HYDROCHLORIDE 10 MG/ML
1 SOLUTION/ DROPS OPHTHALMIC
Status: COMPLETED | OUTPATIENT
Start: 2018-11-29 | End: 2018-11-29

## 2018-11-29 RX ORDER — LIDOCAINE 40 MG/G
CREAM TOPICAL
Status: DISCONTINUED | OUTPATIENT
Start: 2018-11-29 | End: 2018-11-29 | Stop reason: HOSPADM

## 2018-11-29 RX ORDER — OFLOXACIN 3 MG/ML
SOLUTION/ DROPS OPHTHALMIC PRN
Status: DISCONTINUED | OUTPATIENT
Start: 2018-11-29 | End: 2018-11-29 | Stop reason: HOSPADM

## 2018-11-29 RX ORDER — TETRACAINE HYDROCHLORIDE 5 MG/ML
SOLUTION OPHTHALMIC PRN
Status: DISCONTINUED | OUTPATIENT
Start: 2018-11-29 | End: 2018-11-29 | Stop reason: HOSPADM

## 2018-11-29 RX ORDER — ONDANSETRON 2 MG/ML
4 INJECTION INTRAMUSCULAR; INTRAVENOUS EVERY 30 MIN PRN
Status: DISCONTINUED | OUTPATIENT
Start: 2018-11-29 | End: 2018-11-29 | Stop reason: HOSPADM

## 2018-11-29 RX ADMIN — FLURBIPROFEN SODIUM 0.5 ML: 0.3 SOLUTION/ DROPS OPHTHALMIC at 10:25

## 2018-11-29 RX ADMIN — OFLOXACIN 1 DROP: 3 SOLUTION/ DROPS OPHTHALMIC at 10:19

## 2018-11-29 RX ADMIN — CYCLOPENTOLATE HYDROCHLORIDE 1 DROP: 10 SOLUTION/ DROPS OPHTHALMIC at 10:23

## 2018-11-29 RX ADMIN — CYCLOPENTOLATE HYDROCHLORIDE 1 DROP: 10 SOLUTION/ DROPS OPHTHALMIC at 10:18

## 2018-11-29 RX ADMIN — PHENYLEPHRINE HYDROCHLORIDE 1 DROP: 25 SOLUTION/ DROPS OPHTHALMIC at 10:19

## 2018-11-29 RX ADMIN — MIDAZOLAM 1 MG: 1 INJECTION INTRAMUSCULAR; INTRAVENOUS at 11:45

## 2018-11-29 RX ADMIN — PROPARACAINE HYDROCHLORIDE 1 DROP: 5 SOLUTION/ DROPS OPHTHALMIC at 10:18

## 2018-11-29 RX ADMIN — MIDAZOLAM 1 MG: 1 INJECTION INTRAMUSCULAR; INTRAVENOUS at 11:32

## 2018-11-29 RX ADMIN — PHENYLEPHRINE HYDROCHLORIDE 1 DROP: 25 SOLUTION/ DROPS OPHTHALMIC at 10:24

## 2018-11-29 RX ADMIN — PHENYLEPHRINE HYDROCHLORIDE 1 DROP: 100 SOLUTION/ DROPS OPHTHALMIC at 10:37

## 2018-11-29 ASSESSMENT — COPD QUESTIONNAIRES: COPD: 1

## 2018-11-29 NOTE — ANESTHESIA POSTPROCEDURE EVALUATION
Patient: Nir Monson    Procedure(s):  LEFT CATARACT EXTRACTION WITH IMPLANT    Diagnosis:LEFT CATARACT  Diagnosis Additional Information: No value filed.    Anesthesia Type:  MAC    Note:  Anesthesia Post Evaluation    Patient location during evaluation: Phase 2 and Bedside  Patient participation: Able to fully participate in evaluation  Level of consciousness: awake and alert  Pain management: adequate  Airway patency: patent  Cardiovascular status: acceptable  Respiratory status: acceptable  Hydration status: stable  PONV: none     Anesthetic complications: None          Last vitals:  Vitals:    11/29/18 1210 11/29/18 1215 11/29/18 1216   BP: 162/91 129/100    Temp:      SpO2: 97% 97% 98%         Electronically Signed By: Soto Burciaga MD  November 29, 2018  12:24 PM

## 2018-11-29 NOTE — ANESTHESIA CARE TRANSFER NOTE
Patient: Nir Monson    Procedure(s):  LEFT CATARACT EXTRACTION WITH IMPLANT    Diagnosis: LEFT CATARACT  Diagnosis Additional Information: No value filed.    Anesthesia Type:   MAC     Note:  Airway :Room Air  Patient transferred to:Phase II  Handoff Report: Identifed the Patient, Identified the Reponsible Provider, Reviewed the pertinent medical history, Discussed the surgical course, Reviewed Intra-OP anesthesia mangement and issues during anesthesia, Set expectations for post-procedure period and Allowed opportunity for questions and acknowledgement of understanding      Vitals: (Last set prior to Anesthesia Care Transfer)    CRNA VITALS  11/29/2018 1129 - 11/29/2018 1205      11/29/2018             Pulse: 60    Ht Rate: 59    SpO2: 99 %                Electronically Signed By: ROMINA Garcia CRNA  November 29, 2018  12:05 PM

## 2018-11-29 NOTE — IP AVS SNAPSHOT
MRN:[id number]                      After Visit Summary   11/29/2018    Nir Monson    MRN: [id number]           Thank you!     Thank you for choosing [facility] for your care. Our goal is always to provide you with excellent care. Hearing back from our patients is one way we can continue to improve our services. Please take a few minutes to complete the written survey that you may receive in the mail after you visit with us. Thank you!        Patient Information     Date Of Birth          1945        About your hospital stay     You were admitted on:  November 29, 2018 You last received care in the:  [facility] Preop/Phase II    You were discharged on:  November 29, 2018       Who to Call     For medical emergencies, please call 911.  For non-urgent questions about your medical care, please call your primary care provider or clinic, [phone]  For questions related to your surgery, please call your surgery clinic        Attending Provider     Provider Neil Jacobs MD Ophthalmology       Primary Care Provider Office Phone # Fax #    Kitty MATIAS MD [place] [phone]      Further instructions from your care team       AFTER CATARACT SURGERY RESTRICTIONS    SHIELD: WEAR OVER SURGICAL EYE AT NIGHT FOR 1 WEEK    ACTIVITY/LIFTING: Avoid activities, which increase abdominal/head pressure such as pulling on a starter cord, heavy lifting (over 15-20 lbs) or bending with the head below the waist, for 1 week. Avoid sudden jerky movements (chopping, shoveling) for 1 week. Waking and lower body exercise such as biking is okay.     WATER: Showering/washing hair is okay the day after surgery, but avoid excess water, soap, or shampoo in your eyes. Clean eyelids gently with a clean, wet washcloth as needed. Avoid pressure on the eye.     SUNLIGHT: If the eye is light sensitive after surgery, dark glasses may be worn.     DIET/MEDICATIONS: Resume your usual diet and medications  your family doctor has prescribed. Continue to use/follow the instructions for your eye drops.     DRIVING: Avoid driving with a patch. Resume driving when you feel safe, but don't drive on the day of surgery.    PAIN: Minor pain and itching is normal during healing. DO NOT RUB THE EYE! Report any unusual swelling, increased discharge or pain that is not relieved by Tylenol (or equivalent). If sudden drop/change in vision call immediately. [facility] [phone]    CONTINUE TO USE ALL THE EYE DROPS PER THE INSTRUCTIONS ORDERED BY DR. THAO'S TECHNICIANS    FOR EMERGENCY DR. LANDRY: [phone]    FOLLOW EYE DROP INSTRUCTIONS GIVEN BY 's OFFICE      Post-Anesthesia Patient Instructions    IMMEDIATELY FOLLOWING SURGERY:  Do not drive or operate machinery for the first twenty four hours after surgery.  Do not make any important decisions for twenty four hours after surgery or while taking narcotic pain medications or sedatives.  If you develop intractable nausea and vomiting or a severe headache please notify your doctor immediately.    FOLLOW-UP:  Please make an appointment with your surgeon as instructed. You do not need to follow up with anesthesia unless specifically instructed to do so.    WOUND CARE INSTRUCTIONS (if applicable):  Keep a dry clean dressing on the anesthesia/puncture wound site if there is drainage.  Once the wound has quit draining you may leave it open to air.  Generally you should leave the bandage intact for twenty four hours unless there is drainage.  If the epidural site drains for more than 36-48 hours please call the anesthesia department.    QUESTIONS?:  Please feel free to call your physician or the hospital  if you have any questions, and they will be happy to assist you.       Pending Results     No orders found from 11/27/2018 to 11/30/2018.            Admission Information     Date & Time Provider Department Dept. Phone    11/29/2018 Neil Landry MD [facility]  "Preop/Phase -[phone]      Your Vitals Were     Blood Pressure Temperature Height Weight Pulse Oximetry BMI (Body Mass Index)    173/86 96.7  F (35.9  C) (Oral) 1.721 m (5' 7.75\") 82.6 kg (182 lb) 96% 27.88 kg/m2      [employer] Information     [employer] lets you send messages to your doctor, view your test results, renew your prescriptions, schedule appointments and more. To sign up, go to [email or web address] . Click on \"Log in\" on the left side of the screen, which will take you to the Welcome page. Then click on \"Sign up Now\" on the right side of the page.     You will be asked to enter the access code listed below, as well as some personal information. Please follow the directions to create your username and password.     Your access code is: [email or web address]  Expires: 2019 11:09 AM     Your access code will  in 90 days. If you need help or a new code, please call your [facility] clinic or [phone].        Care EveryWhere ID     This is your Care EveryWhere ID. This could be used by other organizations to access your [facility] medical records  [id number]        Equal Access to Services     ROBERTO ASHER AH: Mandy langleyo Sodustin, waaxda luqadaha, qaybta kaalmada adeegyada, logan overton. So [facility] [phone].    ATENCIÓN: Si habla español, tiene a lerma disposición servicios gratuitos de asistencia lingüística. Judy al [phone].    We comply with applicable federal civil rights laws and [place] laws. We do not discriminate on the basis of race, color, national origin, age, disability, sex, sexual orientation, or gender identity.               Review of your medicines      CONTINUE these medicines which have NOT CHANGED        Dose / Directions    ASPIRIN NOT PRESCRIBED   Commonly known as:  INTENTIONAL        Dose:  300 each   300 each continuous prn for other Please choose reason not prescribed, below   Refills:  0       IBUPROFEN PO        Dose:  200 mg "   Take 200 mg by mouth every 6 hours as needed for moderate pain   Refills:  0       LIPITOR PO        Dose:  20 mg   Take 20 mg by mouth daily   Refills:  0       LISINOPRIL PO        Dose:  10 mg   Take 10 mg by mouth daily   Refills:  0                Protect others around you: Learn how to safely use, store and throw away your medicines at www.disposemymeds.org.             Medication List: This is a list of all your medications and when to take them. Check marks below indicate your daily home schedule. Keep this list as a reference.      Medications           Morning Afternoon Evening Bedtime As Needed    ASPIRIN NOT PRESCRIBED   Commonly known as:  INTENTIONAL   300 each continuous prn for other Please choose reason not prescribed, below                                IBUPROFEN PO   Take 200 mg by mouth every 6 hours as needed for moderate pain                                LIPITOR PO   Take 20 mg by mouth daily                                LISINOPRIL PO   Take 10 mg by mouth daily

## 2018-11-29 NOTE — DISCHARGE INSTRUCTIONS
AFTER CATARACT SURGERY RESTRICTIONS    SHIELD: WEAR OVER SURGICAL EYE AT NIGHT FOR 1 WEEK    ACTIVITY/LIFTING: Avoid activities, which increase abdominal/head pressure such as pulling on a starter cord, heavy lifting (over 15-20 lbs) or bending with the head below the waist, for 1 week. Avoid sudden jerky movements (chopping, shoveling) for 1 week. Waking and lower body exercise such as biking is okay.     WATER: Showering/washing hair is okay the day after surgery, but avoid excess water, soap, or shampoo in your eyes. Clean eyelids gently with a clean, wet washcloth as needed. Avoid pressure on the eye.     SUNLIGHT: If the eye is light sensitive after surgery, dark glasses may be worn.     DIET/MEDICATIONS: Resume your usual diet and medications your family doctor has prescribed. Continue to use/follow the instructions for your eye drops.     DRIVING: Avoid driving with a patch. Resume driving when you feel safe, but don't drive on the day of surgery.    PAIN: Minor pain and itching is normal during healing. DO NOT RUB THE EYE! Report any unusual swelling, increased discharge or pain that is not relieved by Tylenol (or equivalent). If sudden drop/change in vision call immediately. [facility] [phone]    CONTINUE TO USE ALL THE EYE DROPS PER THE INSTRUCTIONS ORDERED BY DR. THAO'S TECHNICIANS    FOR EMERGENCY DR. LANDRY: [phone]    FOLLOW EYE DROP INSTRUCTIONS GIVEN BY 's OFFICE      Post-Anesthesia Patient Instructions    IMMEDIATELY FOLLOWING SURGERY:  Do not drive or operate machinery for the first twenty four hours after surgery.  Do not make any important decisions for twenty four hours after surgery or while taking narcotic pain medications or sedatives.  If you develop intractable nausea and vomiting or a severe headache please notify your doctor immediately.    FOLLOW-UP:  Please make an appointment with your surgeon as instructed. You do not need to follow up with anesthesia  unless specifically instructed to do so.    WOUND CARE INSTRUCTIONS (if applicable):  Keep a dry clean dressing on the anesthesia/puncture wound site if there is drainage.  Once the wound has quit draining you may leave it open to air.  Generally you should leave the bandage intact for twenty four hours unless there is drainage.  If the epidural site drains for more than 36-48 hours please call the anesthesia department.    QUESTIONS?:  Please feel free to call your physician or the hospital  if you have any questions, and they will be happy to assist you.

## 2018-11-29 NOTE — IP AVS SNAPSHOT
[facility] Preop/Phase II    [address] [address]    Phone:  [phone]                                       After Visit Summary   11/29/2018    Nir Monson    MRN: [id number]           After Visit Summary Signature Page     I have received my discharge instructions, and my questions have been answered. I have discussed any challenges I see with this plan with the nurse or doctor.    ..........................................................................................................................................  Patient/Patient Representative Signature      ..........................................................................................................................................  Patient Representative Print Name and Relationship to Patient    ..................................................               ................................................  Date                                   Time    ..........................................................................................................................................  Reviewed by Signature/Title    ...................................................              ..............................................  Date                                               Time          22EPIC Rev 08/18

## 2018-11-29 NOTE — OR NURSING
Pateint discharged to home .  Neil score 20. Pain level 0/10.  Discharged from unit via walking .

## 2018-11-29 NOTE — OP NOTE
DATE OF SERVICE: 11/29/18      PREOPERATIVE DIAGNOSIS:     Cataract, Right eye.       POSTOPERATIVE DIAGNOSIS:  Mature Cataract with miosis, Right eye.       PROCEDURE: Complex Phacoemulsification with intraocular lens implant, Model PCB00, 19.5 diopter power.      ANESTHESIA:  Topical with IV sedation.         DESCRIPTION OF PROCEDURE:   Operative risks, benefits and alternatives to the procedure were discussed at length with the patient including loss of vision, loss of the eye.  Patient voiced understanding and informed consent was signed.  The patient was taken to the operating suite, where an appropriate level of anesthesia was given.  Attention was placed to the right eye.  The patient was then prepped and draped in the usual sterile ophthalmic manner.  A lid speculum was placed in the eye to provide exposure and MVR blade was used to make a paracentesis.  Once this was performed, Shugarcaine was then placed intracamerally.  This was then followed by intracameral Viscoat.  A 2.75 mm blade was then used to make a biplanar temporal clear corneal incision.  A Malyugin ring was placed using an osher manipulator.  A cystotome and uttrata were used to make a continuous curvilinear capsulorrhexis.  BSS on Kitchen cannula was then used to hydrodissect the lens.  Phacoemulsification was then performed in a divide-and-conquer technique to remove all pieces of the nucleus.  Irrigation aspiration was then used to remove all remaining cortical material and debris.  Amvisc was then used to fill the capsular bag.  A PCB00 19.5 diopter lens was then injected into the capsular bag using the injector.  Once this was performed, a Goldberg secondary instrument was used to rotate the lens into proper position.  The Malyugin ring was removed using an osher manipulator and the injector.   Irrigation aspiration was then used to remove all remaining viscoelastic material and center the lens appropriately.  BSS on 30 gauge cannula was  then used to hydrate both wounds.  A Weck-Camila was used to assess intraocular IOP.  Once this was stable and the lens was found to be in good position, the patient was undraped.  The patient was brought to the recovery area in stable condition.  Family was made aware of the patient's condition and the patient will follow up with us later this afternoon.  No complications.           Neil Berry MD

## 2021-02-09 ENCOUNTER — IMMUNIZATION (OUTPATIENT)
Dept: FAMILY MEDICINE | Facility: OTHER | Age: 76
End: 2021-02-09
Attending: FAMILY MEDICINE
Payer: MEDICARE

## 2021-02-09 PROCEDURE — 91300 PR COVID VAC PFIZER DIL RECON 30 MCG/0.3 ML IM: CPT

## 2021-02-27 ENCOUNTER — HEALTH MAINTENANCE LETTER (OUTPATIENT)
Age: 76
End: 2021-02-27

## 2021-03-02 ENCOUNTER — IMMUNIZATION (OUTPATIENT)
Dept: FAMILY MEDICINE | Facility: OTHER | Age: 76
End: 2021-03-02
Attending: FAMILY MEDICINE
Payer: MEDICARE

## 2021-03-02 PROCEDURE — 91300 PR COVID VAC PFIZER DIL RECON 30 MCG/0.3 ML IM: CPT

## 2021-10-03 ENCOUNTER — HEALTH MAINTENANCE LETTER (OUTPATIENT)
Age: 76
End: 2021-10-03

## 2022-02-15 ENCOUNTER — TELEPHONE (OUTPATIENT)
Dept: CARDIAC REHAB | Facility: HOSPITAL | Age: 77
End: 2022-02-15
Payer: COMMERCIAL

## 2022-02-15 NOTE — TELEPHONE ENCOUNTER
Patient was called to schedule Phase II Cardiac Rehab. Message left for patient to call [phone].   RT Surendra on 2/15/2022 at 10:43 AM    
0 = independent

## 2022-03-19 ENCOUNTER — HEALTH MAINTENANCE LETTER (OUTPATIENT)
Age: 77
End: 2022-03-19

## 2022-03-22 ENCOUNTER — HOSPITAL ENCOUNTER (OUTPATIENT)
Dept: CARDIAC REHAB | Facility: HOSPITAL | Age: 77
Setting detail: THERAPIES SERIES
Discharge: HOME | End: 2022-03-22
Attending: FAMILY MEDICINE
Payer: MEDICARE

## 2022-03-22 VITALS — BODY MASS INDEX: 25.76 KG/M2 | WEIGHT: 170 LBS | HEIGHT: 68 IN

## 2022-03-22 PROCEDURE — 999N000109 HC STATISTIC OP CR VISIT

## 2022-03-22 PROCEDURE — 93798 PHYS/QHP OP CAR RHAB W/ECG: CPT

## 2022-03-22 ASSESSMENT — 6 MINUTE WALK TEST (6MWT)
FEMALE CALC: 1323.95
TOTAL DISTANCE WALKED (FT): 1400
PREDICTED: 1554.07
GENDER SELECTION: MALE
MALE CALC: 1544.65

## 2022-03-22 NOTE — PROGRESS NOTES
"   03/22/22 1200   Session   Session Initial Evaluation and Exercise Prescription   Certified through this date 04/20/22   Cardiac Rehab Assessment   Cardiac Rehab Assessment 3/22: Pt initiates phase II rehab S/P TAVR on 2/9/2022 at [facility].  Pt has hx of known progressive severe calcific aortic stenosis that pt says they've been watching for years. Additional hx includes, HTN, HLP, prostate cancer (2012), pacemaker (2016), left shoulder bursitis, lumbar stenosis, and smoker. Pt lives at home with his wife, Margarita, and son, Amilcar. Survey scores were within normal range and pt complies with medications. Pt's BP was elevated during initial evaluation. Staff will monitor during next session and contact MD if elevation continues. Pt stated his \"BP noramally increases during situations like this\". Pt will be introduced to cardiac gym and equipment on 3/23 at 1pm and will be attending every Wednesday and Friday.     The patient's history and clinical status including hemodynamics and ECG were evaluated. The patient was assessed to be stable and appropriate to begin exercise. The patient's functional capacity and exercise prescription were determined by the completion of the 6 minute walk test. See results above. The patient was oriented to the program. Risk factor profile was completed. Goals and objectives were discussed. CV response was WNL. No symptoms, complaints or pain were reported. Good prognosis for reaching goals below. Skilled therapy is necessary in order to monitor CV response to exercise, to provide education on risk factors and behavior change counseling needed to achieve patient's goals.  Plan to progress to 30-40 minutes of exercise prior to discharge from cardiac rehab. Initial THR of 20-30 beats above RHR; Effort rating of 4-6/10. Initiate muscle conditioning as appropriate. Provide risk factor education and behavior change counseling.    General Information   Treatment Diagnosis Valve " Replacement   Date of Treatment Diagnosis 02/09/22   Significant Past CV History Pacemaker  (2016)   Comorbidities Metastatic Cancer  (prostate cancer 2012)   Other Medical History HTN, HLP, lumbar stenosis, left shoulder bursitis   Lead up symptoms 3/22: Pt has hx of known progressive severe calcific aortic stenosis that pt says they've been watching for years.    Hospital Location [facility] Discharge Date 02/10/22   Signs and Symptoms Post Hospital Discharge None   Comments 3/22: Pt states they were not symptomatic before TAVR and don't feel any different.   Outpatient Cardiac Rehab Start Date 03/22/22   Primary Physician Dr. Bell   Primary Physician Follow Up Advised to schedule appointment   Specialist Dr. Wright   Cardiologist Dr. 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 No   Have you fallen and had an injury in the past year? No   Timed Up and Go score (seconds) NA   Is patient a fall risk? No   Fall screen comments 3/22: Pt is not a fall risk at this time. Staff will continue to moitor and assess pt'.   Abuse Screen (yes response referral indicated)   Feels Unsafe at Home or Work/School no   Feels Threatened by Someone no   Does Anyone Try to Keep You From Having Contact with Others or Doing Things Outside Your Home? no   Physical Signs of Abuse Present no   Patient needs abuse support services and resources No  "  Pain   Patient Currently in Pain Yes   Pain Location lower back/shoulder   Pain Rating 2   Pain Description Ache   Pain Description Comment 3/22: Pt has constant pain in his lower back and left shoulder.   Pain Treatment Recommendations 3/22: [facility] recommended back surgery which pt opted out of. Pt takes tylenol which seems to help reduce the pain.   Physical Assessments   Incisions WNL   Edema None   Right Lung Sounds not assessed   Left Lung Sounds not assessed   Limitations No limitations   Comments 3/22: Pt's L shoulder is a limiting factor as he has reduced ROM due to pain.   Individualized Treatment Plan   Monitored Sessions Scheduled 25   Monitored Sessions Attended 1   Oxygen   Supplemental Oxygen needed No   Nutrition Management - Weight Management   Assessment Initial Assessment   Age 76   Weight 77.1 kg (170 lb)   Height 1.727 m (5' 8\")   BMI (Calculated) 25.85   Goal Weight 81.6 kg (180 lb)   Initial Rate Your Plate Score. Dietary tool to assess eating patterns. Scores range from 24 to 72. The higher the score the healthier the eating pattern. 39   Weight Management Comments 3/22: Pt states he would like to \"be up 10 pounds\".   Nutrition Management - Lipids   Lipids Labs Not Available   Prescribed Lipid Medication Yes   Statin Intensity Moderate Intensity   Lipid Comments 3/22: Pt is compliant with prescribed lipid medication.   Nutrition Management - Diabetes   Diabetes No   Nutrition Management Summary   Dietary Recommendations Low Sodium   Stages of Change for Diet Compliance Contemplation   Interventions Planned Attend Nutrition Education Class(es)   Nutrition Summary Comments 3/22: Pt sstates he was aanot discharged with a recommended diet. Pt's wife does all the cooking for them.   Nutrition Target Outcome Total Chol < 150, HDL > 40 (M), HDL > 50 (W), LDL < 70, Trig < 150   Psychosocial Management   Psychosocial Assessment Initial   Is there history of clinical depression or increased risk " of depression? No previous history   Current Level of Stress per Patient Report Denies   Current Coping Skills Uses Stress Management/Relaxation Techniques  (moves forward)   Initial Patient Health Questionnaire -9 Score (PHQ-9) for depression. 5-9 Minimal symptoms, 10-14 Minor depression, 15-19 Major depression, moderately severe, > 20 Major depression, severe  0   Initial [facility] Survey score.  Quality of Life:   If total score > 25 review individual areas where patient rated a 4 or 5.  Consider patients current medical condition and what role that plays on the score.   Adjust treatment protocol to improve areas of concern.  Consider the following:  PHQ9 score, DASI, and re-assessment within the next 30 days to assist with developing treatments.  18   Stages of Change Preparation   Interventions Planned Patient denies need for intervention at this time.   Patient Goal No   Psychosocial Comments 3/22: Pt states he has nothing to be stressed over and when it does occur he pushes through it.   Psychosocial Target Outcome Maximize coping skills   Other Core Components - Hypertension   History of or Diagnosis of Hypertension Yes   Currently taking Anti-Hypertensives Yes;CCB   Hypertension Comments 3/22: Pt is compliant with prescribed medication.   Other Core Components - Tobacco   History of Tobacco Use Yes   Tobacco Use Status Currently smoking - everyday   Tobacco Habit Cigarettes   Tobacco Use per Day (average) 1-1.5ppd   Years of Tobacco Use 50 yrs   Stages of Change Pre-Contemplation   Tobacco Comments 3/22: Pt has been smoking since he was 13 years old with a 10 year hiatus. Pt previously tried chanti, but experienced nightmares which scared his wife. Currently, pt has no interest in quitting at this time but may try again in the future.   Other Core Components Summary   Interventions Planned NA: Patient currently in pre-contemplation for smoking cessation   Patient Goals No   Other Core Components  Comments 3/22: Pt monitors his weight daily and does have blood pressure monitor at home, but does not use it regularly.   Other Core Components Target Outcome Compliance with Diet, Medications and Symptom Management to allow for stable Heart Failure   Activity/Exercise History   Activity/Exercise Assessment Initial   Activity/Exercise Status prior to event? Was Physically Active   Number of Days Currently participating in Moderate Physical Activity? 0   Number of Days Currently performing  Aerobic Exercise (including rehab)? 0   Current Stage of Change (Physical Activity) Preparation   Current Stage of Change (Aerobic Exercise) Preparation   Activity/Exercise Comments 3/22: Pt golfs a lot during the summer and shovels/snow blows in the winter for physical activity.   Activity/Exercise Target Outcome An Accumulation of 150  Minutes of Aerobic Activity per Week   Exercise Assessment   6 Minute Walk Predicted - Gender Selection Male   6 Minute Walk Predicted (Male) 1544.65   6 Minute Walk Predicted (Female) 1323.95   Initial 6 Minute Walk Distance (Feet) 1400 ft   Resting HR 60 bpm   Exercise HR 89 bpm   Post Exercise HR 60 bpm   Resting /88   Exercise /8   Post Exercise /88   Pre SpO2 99   While Exercising SpO2 99   Post SpO2 99   Effort Rating 2   Current MET Level 3   MET Level Goal 4   ECG Rhythm Paced rhythm;Right bundle branch block   Ectopy None   Current Symptoms Denies symptoms   Limitations/Restrictions Other (see comments)  (lower back/left shoulder)   Exercise Prescription   Mode Treadmill;Nustep;Recumbant bike;Ambulation;Upright bike;LE strengthening   Duration/Time 15-30 min   Frequency 2 days/week   THR (85% of age predicted max HR) 120.7   OMNI Effort Rating (0-10 Scale) 4-6/10   Progression Continuous bouts;Total exercise time of 20-30 minutes;Aerobic exercise to OMNI rating of 5-7, and heart rate at or below target   Comments 3/22: Pt will be introduced to cardiac gym and equipment  on 3/23 at 1pm.   Recommended Home Exercise   Type of Exercise Walking   Frequency (days per week) 2-3   Duration (minutes per session) 15-30 min   Effort Rating Recommended 4-6/10   30 Day Exercise Plan 3/22: Pt has been encouraged to start walking at homeonce weather permits.   Current Home Exercise   Type of Exercise None   Follow-up/On-going Support   Provider follow-up needed on the following No follow-up needed   Learning Assessment   Learner Patient   Primary Language English   Preferred Learning Style Listening   Barriers to Learning No barriers noted   Patient Education   Title of Program Cardiac Rehab Education   Education recommended Anatomy and Physiology of the Heart;Blood Pressure;Exercise Principles;Heart Failure;Medication Overview;Nutrition;Risk Factors;Stress Management   Education Comments 3/22: Pt will be provided education on a weekly basis covering personal risk factors.   Physician cosignature/electronic signature indicates approval of this ITP document. I have established, reviewed and made necessary changes to the individualized treatment plan and exercise prescription for this patient.

## 2022-03-23 ENCOUNTER — HOSPITAL ENCOUNTER (OUTPATIENT)
Dept: CARDIAC REHAB | Facility: HOSPITAL | Age: 77
Setting detail: THERAPIES SERIES
Discharge: HOME | End: 2022-03-23
Attending: INTERNAL MEDICINE
Payer: MEDICARE

## 2022-03-23 PROCEDURE — 93798 PHYS/QHP OP CAR RHAB W/ECG: CPT

## 2022-03-23 PROCEDURE — 999N000109 HC STATISTIC OP CR VISIT

## 2022-03-25 ENCOUNTER — HOSPITAL ENCOUNTER (OUTPATIENT)
Dept: CARDIAC REHAB | Facility: HOSPITAL | Age: 77
Setting detail: THERAPIES SERIES
Discharge: HOME | End: 2022-03-25
Attending: INTERNAL MEDICINE
Payer: MEDICARE

## 2022-03-25 PROCEDURE — 93798 PHYS/QHP OP CAR RHAB W/ECG: CPT

## 2022-03-25 PROCEDURE — 999N000109 HC STATISTIC OP CR VISIT

## 2022-03-30 ENCOUNTER — HOSPITAL ENCOUNTER (OUTPATIENT)
Dept: CARDIAC REHAB | Facility: HOSPITAL | Age: 77
Setting detail: THERAPIES SERIES
Discharge: HOME | End: 2022-03-30
Attending: INTERNAL MEDICINE
Payer: MEDICARE

## 2022-03-30 PROCEDURE — 93798 PHYS/QHP OP CAR RHAB W/ECG: CPT

## 2022-03-30 PROCEDURE — 999N000109 HC STATISTIC OP CR VISIT

## 2022-04-01 ENCOUNTER — HOSPITAL ENCOUNTER (OUTPATIENT)
Dept: CARDIAC REHAB | Facility: HOSPITAL | Age: 77
Setting detail: THERAPIES SERIES
Discharge: HOME | End: 2022-04-01
Attending: INTERNAL MEDICINE
Payer: MEDICARE

## 2022-04-01 PROCEDURE — 999N000109 HC STATISTIC OP CR VISIT

## 2022-04-01 PROCEDURE — 93798 PHYS/QHP OP CAR RHAB W/ECG: CPT

## 2022-04-06 ENCOUNTER — HOSPITAL ENCOUNTER (OUTPATIENT)
Dept: CARDIAC REHAB | Facility: HOSPITAL | Age: 77
Setting detail: THERAPIES SERIES
Discharge: HOME | End: 2022-04-06
Attending: INTERNAL MEDICINE
Payer: MEDICARE

## 2022-04-06 PROCEDURE — 93798 PHYS/QHP OP CAR RHAB W/ECG: CPT

## 2022-04-06 PROCEDURE — 999N000109 HC STATISTIC OP CR VISIT

## 2022-04-08 ENCOUNTER — HOSPITAL ENCOUNTER (OUTPATIENT)
Dept: CARDIAC REHAB | Facility: HOSPITAL | Age: 77
Setting detail: THERAPIES SERIES
Discharge: HOME | End: 2022-04-08
Attending: INTERNAL MEDICINE
Payer: MEDICARE

## 2022-04-08 PROCEDURE — 93798 PHYS/QHP OP CAR RHAB W/ECG: CPT

## 2022-04-08 PROCEDURE — 999N000108 HC STATISTIC OP CARDIAC VISIT #2

## 2022-04-13 ENCOUNTER — HOSPITAL ENCOUNTER (OUTPATIENT)
Dept: CARDIAC REHAB | Facility: HOSPITAL | Age: 77
Setting detail: THERAPIES SERIES
Discharge: HOME | End: 2022-04-13
Attending: INTERNAL MEDICINE
Payer: MEDICARE

## 2022-04-13 PROCEDURE — 93798 PHYS/QHP OP CAR RHAB W/ECG: CPT

## 2022-04-13 PROCEDURE — 999N000109 HC STATISTIC OP CR VISIT

## 2022-04-19 VITALS — HEIGHT: 68 IN | BODY MASS INDEX: 25.91 KG/M2 | WEIGHT: 171 LBS

## 2022-04-19 ASSESSMENT — 6 MINUTE WALK TEST (6MWT)
GENDER SELECTION: MALE
TOTAL DISTANCE WALKED (FT): 1400
PREDICTED: 1550.92
FEMALE CALC: 1320.4
MALE CALC: 1541.53

## 2022-04-19 NOTE — PROGRESS NOTES
04/19/22 1000   Session   Session 30 Day Individualized Treatment Plan   Certified through this date 05/18/22   Cardiac Rehab Assessment   Cardiac Rehab Assessment 4/19: Pt completes 8 sessions of phase II rehab and has currently denied education stating he received enough during his event discharge. Pt focuses on strength training and aerobic exercise while attending 2 days/wk and reaching 5.3 METs with appropriate hemodynamic response. Pt continues to increase workloads as tolerated while focusing on proper form and technique. Staff will continue to monitor pt making adjustments as needed. Skilled services are necessary to monitor CV response and educate on personal risk factors.   General Information   Treatment Diagnosis Valve Replacement   Date of Treatment Diagnosis 02/09/22   Significant Past CV History Pacemaker  (2016)   Comorbidities Metastatic Cancer  (prostate cancer 2012)   Other Medical History HTN, HLP, lumbar stenosis, left shoulder bursitis   Lead up symptoms 3/22: Pt has hx of known progressive severe calcific aortic stenosis that pt says they've been watching for years.    Hospital Location [facility] Discharge Date 02/10/22   Signs and Symptoms Post Hospital Discharge None   Outpatient Cardiac Rehab Start Date 03/22/22   Primary Physician Dr. Mckeon-Enrique   Specialist Dr. Wright   Cardiologist Dr. 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 No   Have you fallen and had an injury in the past year? No   Timed Up and Go score (seconds) NA   Is patient a fall risk? No   Fall screen comments 4/19: Pt is not a fall risk at this time. Staff will continue to monitor and assess pt.   Abuse Screen (yes response referral indicated)   Feels Unsafe at Home or Work/School no   Feels Threatened by Someone no   Does Anyone Try to Keep You From Having Contact with Others or Doing Things Outside Your Home? no   Physical Signs of Abuse Present no   Patient needs abuse support services and resources No   Pain   Patient Currently in Pain Yes   Pain Location lower back/shoulder   Pain Rating 2   Pain Description Ache   Pain Description Comment 4/19: Pt has constant pain in his lower back and left shoulder.   Pain Treatment Recommendations 4/19: [facility] recommended back surgery which pt opted out of. Pt takes tylenol which seems to help reduce the pain.   Physical Assessments   Incisions WNL   Edema None   Right Lung Sounds not assessed   Left Lung Sounds not assessed   Limitations No limitations   Comments 4/19: Pt's L shoulder is a limiting factor as he has reduced ROM due to pain.   Individualized Treatment Plan   Monitored Sessions Scheduled 25   Monitored Sessions Attended 8   Oxygen   Supplemental Oxygen needed No   Nutrition Management - Weight Management   Assessment Re-assessment   Age 76   Weight 77.6 kg (171 lb)   Height 1.727 m (5' 7.99\")   BMI (Calculated) 26.01   Goal Weight 81.6 kg (180 lb)   Initial Rate Your Plate Score. Dietary tool to assess eating patterns. Scores range from 24 to 72. The higher the score the healthier the eating pattern. 39   Weight Management Comments 4/19: Pt states he would like to \"be up 10 pounds\". Since initial evaluation pt's weight has remained the same.   Nutrition Management - Lipids   Lipids Labs Not Available   Prescribed Lipid Medication Yes   Statin Intensity Moderate Intensity   Lipid " Comments 4/19: Pt is compliant with prescribed lipid medication.   Nutrition Management - Diabetes   Diabetes No   Nutrition Management Summary   Dietary Recommendations Low Sodium   Stages of Change for Diet Compliance Contemplation   Interventions Planned Attend Nutrition Education Class(es)   Interventions In Progress or Completed Other (see comments)  (Pt declined nutrition information)   Nutrition Summary Comments 4/19: Pt declined nutrition information.   Nutrition Target Outcome Total Chol < 150, HDL > 40 (M), HDL > 50 (W), LDL < 70, Trig < 150   Psychosocial Management   Psychosocial Assessment Re-assessment   Is there history of clinical depression or increased risk of depression? No previous history   Current Level of Stress per Patient Report Denies   Current Coping Skills Uses Stress Management/Relaxation Techniques  (moves forward)   Initial Patient Health Questionnaire -9 Score (PHQ-9) for depression. 5-9 Minimal symptoms, 10-14 Minor depression, 15-19 Major depression, moderately severe, > 20 Major depression, severe  0   Initial [facility] Survey score.  Quality of Life:   If total score > 25 review individual areas where patient rated a 4 or 5.  Consider patients current medical condition and what role that plays on the score.   Adjust treatment protocol to improve areas of concern.  Consider the following:  PHQ9 score, DASI, and re-assessment within the next 30 days to assist with developing treatments.  18   Stages of Change Preparation   Interventions Planned Patient denies need for intervention at this time.   Patient Goal No   Psychosocial Comments 4/19: Pt states he has nothing to be stressed over and when it does occur he pushes through it.   Psychosocial Target Outcome Maximize coping skills   Other Core Components - Hypertension   History of or Diagnosis of Hypertension Yes   Currently taking Anti-Hypertensives Yes;CCB   Hypertension Comments 4/19: Pt continues to be compliant with  prescribed medications.   Other Core Components - Tobacco   History of Tobacco Use Yes   Tobacco Use Status Currently smoking - everyday   Tobacco Habit Cigarettes   Tobacco Use per Day (average) 1-1.5ppd   Years of Tobacco Use 60 yrs   Stages of Change Pre-Contemplation   Tobacco Comments 3/22: Pt has been smoking since he was 13 years old with a 10 year hiatus. Pt previously tried chantix, but experienced nightmares which scared his wife. Currently, pt has no interest in quitting at this time but may try again in the future.   Other Core Components Summary   Interventions Planned NA: Patient currently in pre-contemplation for smoking cessation   Patient Goals No   Other Core Components Comments 4/19: Pt monitors his weight daily and does have blood pressure monitor at home, but does not use it regularly.   Other Core Components Target Outcome Compliance with Diet, Medications and Symptom Management to allow for stable Heart Failure   Activity/Exercise History   Activity/Exercise Assessment Re-assessment   Activity/Exercise Status prior to event? Was Physically Active   Number of Days Currently participating in Moderate Physical Activity? 2   Number of Days Currently performing  Aerobic Exercise (including rehab)? 2   Current Stage of Change (Physical Activity) Action   Current Stage of Change (Aerobic Exercise) Action   Activity/Exercise Comments 4/19: Pt reaches 5.3 METs with appropriate hemodynamic response  during aerobic exercise. Pt also enjoys performing strength training exercises focusing on upper and lower body.   Activity/Exercise Target Outcome An Accumulation of 150  Minutes of Aerobic Activity per Week   Exercise Assessment   6 Minute Walk Predicted - Gender Selection Male   6 Minute Walk Predicted (Male) 1541.53   6 Minute Walk Predicted (Female) 1320.4   Initial 6 Minute Walk Distance (Feet) 1400 ft   Resting HR 89 bpm   Exercise  bpm   Post Exercise HR 74 bpm   Resting /80   Exercise BP  166/78   Post Exercise /70   Effort Rating 4   Current MET Level 5.3   MET Level Goal 5-6   ECG Rhythm Paced rhythm;Right bundle branch block   Ectopy None   Current Symptoms Denies symptoms   Limitations/Restrictions Other (see comments)  (lower back/left shoulder)   Exercise Prescription   Mode Treadmill;Nustep;Recumbant bike;Ambulation;Upright bike;LE strengthening   Duration/Time 15-30 min   Frequency 2 days/week   THR (85% of age predicted max HR) 120.7   OMNI Effort Rating (0-10 Scale) 4-6/10   Progression Continuous bouts;Total exercise time of 20-30 minutes;Aerobic exercise to OMNI rating of 5-7, and heart rate at or below target   Comments 4/19: Pt has completed 8 sessions of cardiac rehab while focusin on both strength training and aerobic exercise.   Recommended Home Exercise   Type of Exercise Walking   Frequency (days per week) 2-3   Duration (minutes per session) 30-45 min   Effort Rating Recommended 4-6/10   30 Day Exercise Plan 4/19: Staff will address HEP at next session.   Current Home Exercise   Type of Exercise None   Follow-up/On-going Support   Provider follow-up needed on the following No follow-up needed   Learning Assessment   Learner Patient   Primary Language English   Preferred Learning Style Listening   Barriers to Learning No barriers noted   Patient Education   Title of Program Cardiac Rehab Education   Education recommended Anatomy and Physiology of the Heart;Blood Pressure;Exercise Principles;Heart Failure;Medication Overview;Nutrition;Risk Factors;Stress Management   Education Comments 4/19: Pt will be provided education on a weekly basis covering personal risk factors.   Physician cosignature/electronic signature indicates approval of this ITP document. I have established, reviewed and made necessary changes to the individualized treatment plan and exercise prescription for this patient.

## 2022-04-20 ENCOUNTER — HOSPITAL ENCOUNTER (OUTPATIENT)
Dept: CARDIAC REHAB | Facility: HOSPITAL | Age: 77
Setting detail: THERAPIES SERIES
Discharge: HOME | End: 2022-04-20
Attending: INTERNAL MEDICINE
Payer: MEDICARE

## 2022-04-20 PROCEDURE — 999N000109 HC STATISTIC OP CR VISIT

## 2022-04-20 PROCEDURE — 93798 PHYS/QHP OP CAR RHAB W/ECG: CPT

## 2022-04-22 ENCOUNTER — HOSPITAL ENCOUNTER (OUTPATIENT)
Dept: CARDIAC REHAB | Facility: HOSPITAL | Age: 77
Setting detail: THERAPIES SERIES
Discharge: HOME | End: 2022-04-22
Attending: INTERNAL MEDICINE
Payer: MEDICARE

## 2022-04-22 PROCEDURE — 999N000109 HC STATISTIC OP CR VISIT

## 2022-04-22 PROCEDURE — 93798 PHYS/QHP OP CAR RHAB W/ECG: CPT

## 2022-04-27 ENCOUNTER — HOSPITAL ENCOUNTER (OUTPATIENT)
Dept: CARDIAC REHAB | Facility: HOSPITAL | Age: 77
Setting detail: THERAPIES SERIES
Discharge: HOME | End: 2022-04-27
Attending: INTERNAL MEDICINE
Payer: MEDICARE

## 2022-04-27 PROCEDURE — 93798 PHYS/QHP OP CAR RHAB W/ECG: CPT

## 2022-04-27 PROCEDURE — 999N000109 HC STATISTIC OP CR VISIT

## 2022-05-04 ENCOUNTER — HOSPITAL ENCOUNTER (OUTPATIENT)
Dept: CARDIAC REHAB | Facility: HOSPITAL | Age: 77
Setting detail: THERAPIES SERIES
Discharge: HOME | End: 2022-05-04
Attending: INTERNAL MEDICINE
Payer: MEDICARE

## 2022-05-04 PROCEDURE — 93798 PHYS/QHP OP CAR RHAB W/ECG: CPT

## 2022-05-04 PROCEDURE — 999N000109 HC STATISTIC OP CR VISIT

## 2022-05-06 ENCOUNTER — HOSPITAL ENCOUNTER (OUTPATIENT)
Dept: CARDIAC REHAB | Facility: HOSPITAL | Age: 77
Setting detail: THERAPIES SERIES
Discharge: HOME | End: 2022-05-06
Attending: INTERNAL MEDICINE
Payer: MEDICARE

## 2022-05-06 PROCEDURE — 93798 PHYS/QHP OP CAR RHAB W/ECG: CPT

## 2022-05-06 PROCEDURE — 999N000109 HC STATISTIC OP CR VISIT

## 2022-05-11 ENCOUNTER — HOSPITAL ENCOUNTER (OUTPATIENT)
Dept: CARDIAC REHAB | Facility: HOSPITAL | Age: 77
Setting detail: THERAPIES SERIES
Discharge: HOME | End: 2022-05-11
Attending: INTERNAL MEDICINE
Payer: MEDICARE

## 2022-05-11 PROCEDURE — 93798 PHYS/QHP OP CAR RHAB W/ECG: CPT

## 2022-05-11 PROCEDURE — 999N000109 HC STATISTIC OP CR VISIT

## 2022-05-13 ENCOUNTER — HOSPITAL ENCOUNTER (OUTPATIENT)
Dept: CARDIAC REHAB | Facility: HOSPITAL | Age: 77
Setting detail: THERAPIES SERIES
Discharge: HOME | End: 2022-05-13
Attending: INTERNAL MEDICINE
Payer: MEDICARE

## 2022-05-13 VITALS — WEIGHT: 172 LBS | BODY MASS INDEX: 26.07 KG/M2 | HEIGHT: 68 IN

## 2022-05-13 PROCEDURE — 93798 PHYS/QHP OP CAR RHAB W/ECG: CPT

## 2022-05-13 PROCEDURE — 999N000109 HC STATISTIC OP CR VISIT

## 2022-05-13 ASSESSMENT — 6 MINUTE WALK TEST (6MWT)
TOTAL DISTANCE WALKED (FT): 1400
MALE CALC: 1522.44
GENDER SELECTION: MALE
TOTAL DISTANCE WALKED (FT): 1400
FEMALE CALC: 1298.03
PREDICTED: 1531.72

## 2022-05-13 NOTE — PROGRESS NOTES
05/13/22 1400   Session   Session Discharge Note   Certified through this date 06/11/22   Cardiac Rehab Assessment   Cardiac Rehab Assessment 5/13: Pt completes 15 sessions of phase II rehab and was provided education regarding blood pressure. Overall, pt increased appropriately. Pt focused more on the strength training aspect since he does a lot of physical activity outside of phase II rehab. Pt's survey scores improved from pre to post. Pt's 6MWT remained the same from pre to post with hip pain being a limiting factor. Pt continues to smoke on a daily basis. Staff has advised pt to obtain a new blood pressure cuff to keep monitoring his pressure daily as well as his weight. If BP remains elevated pt has been advised to contact MD. Pt is encouraged to call staff if any further questions arise.    Pt made significant gains in exercise tolerance. Initially patient tolerated 44 minutes at 3METs, now tolerating 45 minutes at 5 METs. Patient also increased 6-minute walk test by an increase of 0 feet. The PT was given instructions on frequency (4-5 days/wk), intensity (OMNI Effort Scale), and duration (30-45 minutes intermittent if needed) for continued exercise as well as muscle conditioning and stretching exercises.  Your PT plans to golf 3-4 days/wk and stay physically active around the house.   General Information   Treatment Diagnosis Valve Replacement   Date of Treatment Diagnosis 02/09/22   Significant Past CV History Pacemaker  (2016)   Comorbidities Metastatic Cancer  (prostate cancer 2012)   Other Medical History HTN, HLP, lumbar stenosis, left shoulder bursitis   Lead up symptoms 3/22: Pt has hx of known progressive severe calcific aortic stenosis that pt says they've been watching for years.    Hospital Location [facility] Discharge Date 02/10/22   Signs and Symptoms Post Hospital Discharge None   Outpatient Cardiac Rehab Start Date 03/22/22   Primary Physician Dr. Mckeon-Enrique   Specialist   Adriana   Cardiologist Dr. 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 No   Have you fallen and had an injury in the past year? No   Timed Up and Go score (seconds) NA   Is patient a fall risk? No   Fall screen comments 5/13: Pt is discharged without any falls and is not a fall risk at this time.   Abuse Screen (yes response referral indicated)   Feels Unsafe at Home or Work/School no   Feels Threatened by Someone no   Does Anyone Try to Keep You From Having Contact with Others or Doing Things Outside Your Home? no   Physical Signs of Abuse Present no   Presenting problem NA   Patient needs abuse support services and resources No   Pain   Patient Currently in Pain Yes   Pain Location lower back/shoulder   Pain Rating 2   Pain Description Ache   Pain Description Comment 5/13: Pt has constant pain in his lower back and left shoulder.   Pain Treatment Recommendations 5/13: [facility] recommended back surgery which pt opted out of. Pt takes tylenol which seems to help reduce the pain.   Physical Assessments   Incisions WNL   Edema None   Right Lung Sounds not assessed   Left Lung Sounds not assessed   Limitations No limitations   Comments 5/13: Pt's L shoulder is a limiting factor as he has reduced ROM due to pain.   Individualized Treatment Plan   Monitored Sessions Scheduled 25   Monitored Sessions Attended 15   Oxygen   Supplemental Oxygen  "needed No   Nutrition Management - Weight Management   Assessment Discharge   Age 77   Weight 78 kg (172 lb)   Height 1.727 m (5' 7.99\")   BMI (Calculated) 26.16   Goal Weight 81.6 kg (180 lb)   Initial Rate Your Plate Score. Dietary tool to assess eating patterns. Scores range from 24 to 72. The higher the score the healthier the eating pattern. 39   Discharge Rate Your Plate Score 55   Weight Management Comments 5/13: Pt states he would like to \"be up 10 pounds\". Since initial evaluation pt's weight has remained the same. Pt's RYP survey score increased from pre to post evaluation.   Nutrition Management - Lipids   Lipids Labs Not Available   Prescribed Lipid Medication Yes   Statin Intensity Moderate Intensity   Lipid Comments 5/13: Pt is compliant with prescribed lipid medication.   Nutrition Management - Diabetes   Diabetes No   Nutrition Management Summary   Dietary Recommendations Low Sodium   Stages of Change for Diet Compliance Contemplation   Interventions Planned Attend Nutrition Education Class(es)   Interventions In Progress or Completed Other (see comments)  (Pt declined nutrition information)   Nutrition Summary Comments 5/13: Pt declined nutrition information.   Nutrition Target Outcome Total Chol < 150, HDL > 40 (M), HDL > 50 (W), LDL < 70, Trig < 150   Psychosocial Management   Psychosocial Assessment Discharge   Is there history of clinical depression or increased risk of depression? No previous history   Current Level of Stress per Patient Report Denies   Current Coping Skills Uses Stress Management/Relaxation Techniques  (moves forward)   Initial Patient Health Questionnaire -9 Score (PHQ-9) for depression. 5-9 Minimal symptoms, 10-14 Minor depression, 15-19 Major depression, moderately severe, > 20 Major depression, severe  0   Discharge PHQ-9 Score for Depression 0   Initial [facility] Survey score.  Quality of Life:   If total score > 25 review individual areas where patient rated a 4 or " 5.  Consider patients current medical condition and what role that plays on the score.   Adjust treatment protocol to improve areas of concern.  Consider the following:  PHQ9 score, DASI, and re-assessment within the next 30 days to assist with developing treatments.  18   Discharge [facility] Survey Score 17   Stages of Change Preparation   Interventions Planned Patient denies need for intervention at this time.   Patient Goal No   Psychosocial Comments 5/13: Pt states he has nothing to be stressed over and when it does occur he pushes through it.   Psychosocial Target Outcome Maximize coping skills   Other Core Components - Hypertension   History of or Diagnosis of Hypertension Yes   Currently taking Anti-Hypertensives Yes;CCB   Hypertension Comments 5/13: Pt continues to be compliant with prescribed medications. Pt has been encouraged to continue monitoring BP at home and contact MD if elevation continues.   Other Core Components - Tobacco   History of Tobacco Use Yes   Tobacco Use Status Currently smoking - everyday   Tobacco Habit Cigarettes   Tobacco Use per Day (average) 1-1.5ppd   Years of Tobacco Use 60 yrs   Stages of Change Pre-Contemplation   Tobacco Comments 5/13: Pt has been smoking since he was 13 years old with a 10 year hiatus. Pt previously tried chantix, but experienced nightmares which scared his wife. Currently, pt has no interest in quitting at this time but may try again in the future.   Other Core Components Summary   Interventions Planned NA: Patient currently in pre-contemplation for smoking cessation   Interventions In Progress or Completed None: Patient remains in pre-contemplation for smoking cessation   Patient Goals No   Other Core Components Comments 5/13: Pt has been encouraged to continue monitoring his weight and blood pressure daily notifying MD if elevation continues.   Other Core Components Target Outcome Compliance with Diet, Medications and Symptom Management to allow for  stable Heart Failure   Activity/Exercise History   Activity/Exercise Assessment Discharge   Activity/Exercise Status prior to event? Was Physically Active   Number of Days Currently participating in Moderate Physical Activity? 2   Number of Days Currently performing  Aerobic Exercise (including rehab)? 2   Current Stage of Change (Physical Activity) Action   Current Stage of Change (Aerobic Exercise) Action   Activity/Exercise Comments 5/13: Pt reaches 5.3 METs with appropriate hemodynamic response  during aerobic exercise. Pt also enjoys performing strength training exercises focusing on upper and lower body.   Activity/Exercise Target Outcome An Accumulation of 150  Minutes of Aerobic Activity per Week   Exercise Assessment   6 Minute Walk Predicted - Gender Selection Male   6 Minute Walk Predicted (Male) 1522.44   6 Minute Walk Predicted (Female) 1298.03   Initial 6 Minute Walk Distance (Feet) 1400 ft   Discharge 6 Minute Walk Distance (Feet) 1400   Resting /84   Exercise /82   Post Exercise /78   Pre SpO2 99   While Exercising SpO2 99   Post SpO2 99   Effort Rating 3   Current MET Level 5.3   MET Level Goal 5-6   ECG Rhythm Paced rhythm;Right bundle branch block   Ectopy None   Current Symptoms Denies symptoms   Limitations/Restrictions Other (see comments)  (lower back/left shoulder)   Exercise Prescription   Mode Treadmill;Nustep;Recumbant bike;Ambulation;Upright bike;LE strengthening   Duration/Time 30-45 min   Frequency 2 days/week   THR (85% of age predicted max HR) 119.85   OMNI Effort Rating (0-10 Scale) 4-6/10   Progression Continuous bouts;Total exercise time of 20-30 minutes;Aerobic exercise to OMNI rating of 5-7, and heart rate at or below target   Comments 5/13: Pt reaches 5.3 METs with appropriate hemodynamic response.   Recommended Home Exercise   Type of Exercise Walking;Other (comments)  (golfing)   Frequency (days per week) 4-5 days/wk   Duration (minutes per session) 30-45 min    Effort Rating Recommended 4-6/10   30 Day Exercise Plan 5/13: Pt plans to stay physically active by golfing multiple times during the week.   Current Home Exercise   Type of Exercise None   Follow-up/On-going Support   Provider follow-up needed on the following No follow-up needed   Learning Assessment   Learner Patient   Primary Language English   Preferred Learning Style Listening   Barriers to Learning No barriers noted   Patient Education   Title of Program Cardiac Rehab Education   Education recommended Anatomy and Physiology of the Heart;Blood Pressure;Exercise Principles;Heart Failure;Medication Overview;Nutrition;Risk Factors;Stress Management   Education classes attended Blood Pressure   Education Comments 5/13: Pt was provided blood pressure education and declined the rest.   Physician cosignature/electronic signature indicates agreements with the ITP document and approval of discharge.

## 2022-08-10 ENCOUNTER — MEDICAL CORRESPONDENCE (OUTPATIENT)
Dept: NUCLEAR MEDICINE | Facility: HOSPITAL | Age: 77
End: 2022-08-10

## 2022-08-30 ENCOUNTER — TELEPHONE (OUTPATIENT)
Dept: PET IMAGING | Facility: HOSPITAL | Age: 77
End: 2022-08-30

## 2022-08-31 ENCOUNTER — HOSPITAL ENCOUNTER (OUTPATIENT)
Dept: PET IMAGING | Facility: HOSPITAL | Age: 77
Discharge: HOME | End: 2022-08-31
Attending: FAMILY MEDICINE | Admitting: FAMILY MEDICINE
Payer: MEDICARE

## 2022-08-31 DIAGNOSIS — R91.8 OTHER NONSPECIFIC ABNORMAL FINDING OF LUNG FIELD: ICD-10-CM

## 2022-08-31 DIAGNOSIS — J98.4 OTHER DISORDERS OF LUNG: ICD-10-CM

## 2022-08-31 PROCEDURE — A9552 F18 FDG: HCPCS | Performed by: FAMILY MEDICINE

## 2022-08-31 PROCEDURE — 343N000001 HC RX 343: Performed by: FAMILY MEDICINE

## 2022-08-31 PROCEDURE — 78815 PET IMAGE W/CT SKULL-THIGH: CPT | Mod: PI

## 2022-08-31 RX ADMIN — FLUDEOXYGLUCOSE F-18 13.62 MCI.: 500 INJECTION, SOLUTION INTRAVENOUS at 08:25

## 2022-09-04 ENCOUNTER — HEALTH MAINTENANCE LETTER (OUTPATIENT)
Age: 77
End: 2022-09-04

## 2022-11-16 ENCOUNTER — TRANSFERRED RECORDS (OUTPATIENT)
Dept: HEALTH INFORMATION MANAGEMENT | Facility: CLINIC | Age: 77
End: 2022-11-16

## 2022-11-17 ENCOUNTER — TRANSFERRED RECORDS (OUTPATIENT)
Dept: HEALTH INFORMATION MANAGEMENT | Facility: CLINIC | Age: 77
End: 2022-11-17

## 2022-11-18 ENCOUNTER — MEDICAL CORRESPONDENCE (OUTPATIENT)
Dept: HEALTH INFORMATION MANAGEMENT | Facility: HOSPITAL | Age: 77
End: 2022-11-18

## 2022-11-19 DIAGNOSIS — C34.90 LUNG CANCER (H): Primary | ICD-10-CM

## 2022-11-21 ENCOUNTER — MEDICAL CORRESPONDENCE (OUTPATIENT)
Dept: PET IMAGING | Facility: HOSPITAL | Age: 77
End: 2022-11-21

## 2022-11-22 ENCOUNTER — HOSPITAL ENCOUNTER (OUTPATIENT)
Dept: RESPIRATORY THERAPY | Facility: HOSPITAL | Age: 77
Discharge: HOME | End: 2022-11-22
Attending: INTERNAL MEDICINE | Admitting: INTERNAL MEDICINE
Payer: MEDICARE

## 2022-11-22 DIAGNOSIS — C34.90 LUNG CANCER (H): ICD-10-CM

## 2022-11-22 PROCEDURE — 94060 EVALUATION OF WHEEZING: CPT

## 2022-11-22 PROCEDURE — 94729 DIFFUSING CAPACITY: CPT | Mod: 26 | Performed by: INTERNAL MEDICINE

## 2022-11-22 PROCEDURE — 94726 PLETHYSMOGRAPHY LUNG VOLUMES: CPT | Mod: 26 | Performed by: INTERNAL MEDICINE

## 2022-11-22 PROCEDURE — 94060 EVALUATION OF WHEEZING: CPT | Mod: 26 | Performed by: INTERNAL MEDICINE

## 2022-11-22 PROCEDURE — 94726 PLETHYSMOGRAPHY LUNG VOLUMES: CPT

## 2022-11-22 PROCEDURE — 94729 DIFFUSING CAPACITY: CPT

## 2022-11-22 PROCEDURE — 250N000009 HC RX 250

## 2022-11-22 RX ORDER — ALBUTEROL SULFATE 0.83 MG/ML
2.5 SOLUTION RESPIRATORY (INHALATION) ONCE
Status: COMPLETED | OUTPATIENT
Start: 2022-11-22 | End: 2022-11-22

## 2022-11-22 RX ADMIN — ALBUTEROL SULFATE 2.5 MG: 2.5 SOLUTION RESPIRATORY (INHALATION) at 16:36

## 2022-12-06 ENCOUNTER — TELEPHONE (OUTPATIENT)
Dept: PET IMAGING | Facility: HOSPITAL | Age: 77
End: 2022-12-06

## 2022-12-07 ENCOUNTER — HOSPITAL ENCOUNTER (OUTPATIENT)
Dept: PET IMAGING | Facility: HOSPITAL | Age: 77
Discharge: HOME | End: 2022-12-07
Attending: INTERNAL MEDICINE | Admitting: INTERNAL MEDICINE
Payer: MEDICARE

## 2022-12-07 DIAGNOSIS — C34.90 MALIGNANT NEOPLASM OF UNSPECIFIED PART OF UNSPECIFIED BRONCHUS OR LUNG (H): ICD-10-CM

## 2022-12-07 DIAGNOSIS — R91.1 SOLITARY PULMONARY NODULE: ICD-10-CM

## 2022-12-07 DIAGNOSIS — C34.11 MALIGNANT NEOPLASM OF UPPER LOBE OF RIGHT LUNG (H): ICD-10-CM

## 2022-12-07 PROCEDURE — 78815 PET IMAGE W/CT SKULL-THIGH: CPT | Mod: PS

## 2022-12-07 PROCEDURE — A9552 F18 FDG: HCPCS | Performed by: RADIOLOGY

## 2022-12-07 PROCEDURE — 343N000001 HC RX 343: Performed by: RADIOLOGY

## 2022-12-07 RX ADMIN — FLUDEOXYGLUCOSE F-18 12.89 MCI.: 500 INJECTION, SOLUTION INTRAVENOUS at 11:22

## 2023-04-29 ENCOUNTER — HEALTH MAINTENANCE LETTER (OUTPATIENT)
Age: 78
End: 2023-04-29

## 2023-11-25 ENCOUNTER — APPOINTMENT (OUTPATIENT)
Dept: CT IMAGING | Facility: HOSPITAL | Age: 78
End: 2023-11-25
Attending: NURSE PRACTITIONER
Payer: MEDICARE

## 2023-11-25 ENCOUNTER — HOSPITAL ENCOUNTER (EMERGENCY)
Facility: HOSPITAL | Age: 78
Discharge: HOME | End: 2023-11-25
Attending: NURSE PRACTITIONER | Admitting: NURSE PRACTITIONER
Payer: MEDICARE

## 2023-11-25 VITALS
RESPIRATION RATE: 18 BRPM | SYSTOLIC BLOOD PRESSURE: 146 MMHG | DIASTOLIC BLOOD PRESSURE: 70 MMHG | HEART RATE: 62 BPM | TEMPERATURE: 98.2 F | OXYGEN SATURATION: 97 %

## 2023-11-25 DIAGNOSIS — S00.01XA ABRASION OF SCALP, INITIAL ENCOUNTER: ICD-10-CM

## 2023-11-25 PROBLEM — G89.29 CHRONIC LOW BACK PAIN: Status: ACTIVE | Noted: 2019-08-05

## 2023-11-25 PROBLEM — M54.50 CHRONIC LOW BACK PAIN: Status: ACTIVE | Noted: 2019-08-05

## 2023-11-25 PROBLEM — Z72.0 TOBACCO USE: Status: ACTIVE | Noted: 2019-08-05

## 2023-11-25 PROBLEM — E78.5 HYPERLIPIDEMIA: Status: ACTIVE | Noted: 2019-08-05

## 2023-11-25 PROBLEM — M21.379 FOOT DROP: Status: ACTIVE | Noted: 2019-08-05

## 2023-11-25 PROBLEM — M48.061 SPINAL STENOSIS OF LUMBAR REGION: Status: ACTIVE | Noted: 2019-08-05

## 2023-11-25 PROBLEM — I10 ESSENTIAL HYPERTENSION: Status: ACTIVE | Noted: 2019-08-05

## 2023-11-25 PROCEDURE — 99284 EMERGENCY DEPT VISIT MOD MDM: CPT | Mod: 25

## 2023-11-25 PROCEDURE — G1010 CDSM STANSON: HCPCS

## 2023-11-25 PROCEDURE — 99284 EMERGENCY DEPT VISIT MOD MDM: CPT | Performed by: NURSE PRACTITIONER

## 2023-11-25 RX ORDER — PREDNISONE 10 MG/1
TABLET ORAL
COMMUNITY
Start: 2023-11-16

## 2023-11-25 ASSESSMENT — ENCOUNTER SYMPTOMS
RESPIRATORY NEGATIVE: 1
MUSCULOSKELETAL NEGATIVE: 1
PSYCHIATRIC NEGATIVE: 1
EYES NEGATIVE: 1
ENDOCRINE NEGATIVE: 1
CARDIOVASCULAR NEGATIVE: 1
NEUROLOGICAL NEGATIVE: 1
ALLERGIC/IMMUNOLOGIC NEGATIVE: 1
CONSTITUTIONAL NEGATIVE: 1
WOUND: 1
GASTROINTESTINAL NEGATIVE: 1
HEMATOLOGIC/LYMPHATIC NEGATIVE: 1

## 2023-11-25 NOTE — DISCHARGE INSTRUCTIONS
Wound care:   Wash your wound 2 times each day with soap and water.  After this, apply antibiotic ointment and a dressing (such as a Band-Aid) for 2-3 days.  Otherwise, keep your wound clean and dry.  This means no swimming or extended submersion in water until the wound is completely healed.  Remember, all wounds will leave some sort of scar.    Signs of infection:   Watch for signs of infection which include severe pain at site, increasing redness with pain, pus colored drainage, or if you develop a fever.  If this occurs, come back in for re-evaluation and most likely antibiotic therapy.        Follow-up with your primary care provider for reevaluation.  Contact your primary care provider if you have any questions or concerns.  Do not hesitate to return to the ER if any new or worsening symptoms.     Please read the attached instructions (if any).  They highlight more specific treatments and interventions for you at home.              Thank you for letting me participate in your care and wish you a fast and uneventful recovery,    Mars VANEGAS, CNP    Do not hesitate to contact me with questions or concerns.  [email or web address]  [email or web address]

## 2023-11-25 NOTE — ED PROVIDER NOTES
History     Chief Complaint   Patient presents with    Head Injury     HPI  Nir Monson is a 78 year old individual with history of chronic low back pain, hypertension, spinal stenosis of lumbar region, foot drop, comes in for evaluation of abrasion to the head.  Patient states his wife and daughter made him come in to get checked out.  Patient states that he has abrasion to the top of the head.  Does not remember how he got it.  Does admit to EtOH use last night but does not recall falling.  States he does remember going to bed.  No complaints of dizziness, lightheadedness, headache, visual disturbances, numbness, tingling, weaknesses.    Allergies:  No Known Allergies    Problem List:    Patient Active Problem List    Diagnosis Date Noted    Chronic low back pain 08/05/2019     Priority: Medium    Essential hypertension 08/05/2019     Priority: Medium    Spinal stenosis of lumbar region 08/05/2019     Priority: Medium    Hyperlipidemia 08/05/2019     Priority: Medium    Foot drop 08/05/2019     Priority: Medium    Tobacco use 08/05/2019     Priority: Medium        Past Medical History:    No past medical history on file.    Past Surgical History:    Past Surgical History:   Procedure Laterality Date    COLONOSCOPY - HIM SCAN  01/01/2009    Colonoscopy (NML recheck 10 Yrs) 2009    PHACOEMULSIFICATION WITH STANDARD INTRAOCULAR LENS IMPLANT Right 11/15/2018    Procedure: CATARACT EXTRACTION WITH IMPLANT RIGHT ;  Surgeon: Neil Berry MD;  Location: [facility] OR    PHACOEMULSIFICATION WITH STANDARD INTRAOCULAR LENS IMPLANT Left 11/29/2018    Procedure: LEFT CATARACT EXTRACTION WITH IMPLANT;  Surgeon: Neil Berry MD;  Location: [facility] OR    SURGICAL RADIOLOGY PROCEDURE N/A 8/2/2017    Procedure: SURGICAL RADIOLOGY PROCEDURE;  image guided myelogram;  Surgeon: Provider, [facility] Nursing;  Location: [facility] OR       Family History:    No family history on file.    Social History:  Marital Status:    [2]  Social History     Tobacco Use    Smoking status: Every Day     Packs/day: 0.50     Years: 50.00     Additional pack years: 0.00     Total pack years: 25.00     Types: Cigarettes     Start date: 1957    Smokeless tobacco: Never        Medications:    predniSONE (DELTASONE) 10 MG tablet  ASPIRIN NOT PRESCRIBED (INTENTIONAL)  Atorvastatin Calcium (LIPITOR PO)  IBUPROFEN PO  LISINOPRIL PO          Review of Systems   Constitutional: Negative.    HENT: Negative.     Eyes: Negative.    Respiratory: Negative.     Cardiovascular: Negative.    Gastrointestinal: Negative.    Endocrine: Negative.    Musculoskeletal: Negative.    Skin:  Positive for wound (Superior scalp abrasion.).   Allergic/Immunologic: Negative.    Neurological: Negative.    Hematological: Negative.    Psychiatric/Behavioral: Negative.         Physical Exam   BP: 146/70  Pulse: 62  Temp: (!) 101.5  F (38.6  C)  Resp: 18  SpO2: 97 %      GENERAL APPEARANCE:  The patient is a 78 year old well-developed, well-nourished individual in no acute distress that appears as stated age.  HEENT:  Normocephalic.  Pupils are equal, round, and reactive to light.  Oropharynx is clear.  No avulsed teeth, buccal or tongue lacerations present.  Voice is clear and without muffling.   No otorrhea or rhinorrhea present.  Negative dao sign.  Negative for hemotympanum bilaterally.  NECK:  Supple.  Trachea is midline.    CHEST:  Symmetric.  Non-tender to palpation.  No crepitus or deformity.  LUNGS:  Breathing is easy.  Breath sounds are equal and clear bilaterally.  No wheezes, rhonchi, or rales.  HEART:  Regular rate and rhythm with normal S1 and S2.  No murmurs, gallops, or rubs.  MUSCULOSKELETAL:  Normal gait and station.  No cervical/thoracic/lumbar spinal tenderness, step-offs, deformities noted to palpation.  No paraspinal tenderness noted to palpation.  Pelvis stable upon palpation.  No crepitus, deformities, tenderness noted to palpation to the upper or lower  extremities to palpation.  Range of motion intact to all joints.  Strength equal bilaterally.  MENTAL STATUS:   The patient was alert and oriented to person, place, time and purpose. Registration and recall intact. No difficulty with concentration.  Spontaneous eye opening.  Follows commands.  GCS 15.   CRANIAL NERVES:  PERRL. EOMI; no nystagmus.  Full visual fields.  Trapezius and sternocleidomastoid are full strength. Tongue was midline and protrudes midline. Uvula was midline and raises midline. Facial sensation was intact to pain and light touch at all distributions. No speech disturbance. Hearing intact to conversation and whisper.  No facial asymmetry.  SENSORY:  Sensation intact.  PSYCHIATRIC:  The patient is awake, alert, and oriented x4.  Recent and remote memory is intact.  Appropriate mood and affect.  Calm and cooperative with history and physical exam.  SKIN:  Warm, dry, and well perfused.  Good turgor.  1 cm circular abrasion to left superior scalp.  No foreign body or active bleeding present..  No bruising noted.   TDAP STATUS: Last Tdap given 11/07/2018.      Comment: Discrepancies between my note and notes on behalf of the nursing team or other care providers are secondary to my findings reflecting my physical examination and questioning of the patient.  Any conflicting information provided is not in line with my examination of the patient.       ED Course              ED Course as of 11/25/23 1410   Sat Nov 25, 2023   1335 In to see patient and history/physical completed.    1339 CT of head and neck ordered.   1409 No acute abnormalities on head or cervical spine CT.   1409 Patient with wound to the scalp that is an abrasion.  No suturable area present.  No foreign body present.  Patient has no other acute abnormalities.  Will discharge home with standard wound care instructions as described in discharge handout.  Patient in agreement.                 Results for orders placed or performed during  the hospital encounter of 11/25/23 (from the past 24 hour(s))   CT Head w/o Contrast    Narrative    PROCEDURE: CT HEAD W/O CONTRAST     HISTORY: Injury.    COMPARISON: None.    TECHNIQUE:  Helical images of the head from the foramen magnum to the  vertex were obtained without contrast. This CT exam was performed  using one or more the following dose reduction techniques: automated  exposure control, adjustment of the mA and/or kV according to patient  size, and/or iterative reconstruction technique.    FINDINGS: The ventricles and sulci are prominent, compatible with  moderate, generalized volume loss. No acute intracranial hemorrhage,  mass effect, midline shift, hydrocephalus or basilar cystern  effacement are present.    No acute transcortical ischemia is identified. Scattered  hypoattenuation within the supratentorial subcortical and  periventricular white matter is most compatible with microvascular  ischemic change.     The calvarium is intact.  The visualized paranasal sinuses are clear.      Impression    IMPRESSION: No acute intracranial hemorrhage or calvarial fracture.      BARBY ARITA MD         SYSTEM ID:  [id number]   CT Cervical Spine w/o Contrast    Narrative    PROCEDURE: CT CERVICAL SPINE W/O CONTRAST     HISTORY: Injury.    TECHNIQUE: Helical noncontrast CT images of the cervical spine. This  CT exam was performed using one or more the following dose reduction  techniques: automated exposure control, adjustment of the mA and/or kV  according to patient size, and/or iterative reconstruction technique.    COMPARISON: None.    FINDINGS:     No acute fracture is identified. The vertebral bodies are normal in  height. The cervical lordosis is relatively preserved. The C1-2  articulation and the craniocervical junction are intact.     Diffuse degenerative changes are seen without severe spinal stenosis.     The paravertebral soft tissues are unremarkable. The lung apices are  clear.       Impression    IMPRESSION: No evidence of acute cervical spine fracture.    BARBY ARITA MD         SYSTEM ID:  [id number]       Medications - No data to display    Assessments & Plan (with Medical Decision Making)     I have reviewed the nursing notes.    I have reviewed the findings, diagnosis, plan and need for follow up with the patient.      Summary:  Patient presents to the ER today for scalp laceration.  Potential diagnosis which have been considered and evaluated include intracerebral bleed, skull fracture, abrasion, foreign body, as well as others. Many of these have been excluded using the various modalities and assessment as noted on the chart. At the present time, the diagnosis given seems to be the most likely scalp abrasion.  Upon arrival, vitals signs are normal.  The patient is alert and oriented no distress.  Neurological examination normal.  Musculoskeletal examination normal.  Does have abrasion to superior scalp with no active bleeding or foreign body.  Patient does not remember how he obtained the scalp abrasion.  For this reason CT of head and cervical spine were conducted showing no acute abnormalities.     Neurological examination normal.  Musculoskeletal examination normal.  CT negative for acute abnormalities.  No suturable wounds to the scalp.  Will discharge home on standard wound care as discussed in AVS.  Follow-up with PCP as needed.  Patient verbalized understanding agrees with plan of care.  Patient discharged home.      Critical Care Time: None    Impression and plan discussed with patient. Questions answered, concerns addressed, indications for urgent re-evaluation reviewed, and  given. Patient/Parent/Caregiver agree with treatment plan and have no further questions at this time.  AVS provided at discharge.    This note was created by the Dragon Voice Dictation System. Inadvertent typographical errors, due to software recognition problems, may still exist.              New Prescriptions    No medications on file       Final diagnoses:   Abrasion of scalp, initial encounter       11/25/2023   [facility]       Mars Patton, ROMINA CNP  11/25/23 5652

## 2023-11-25 NOTE — ED TRIAGE NOTES
"Patient reports his wife and daughter \"made him come in to get his head checked out for a concussion\".  He unable to recall how or when he got abrasion on his head. No bleeding.  Patient is alert, denies pain or changes in vision.  BEFAST negative.     Triage Assessment (Adult)       Row Name 11/25/23 1324          Triage Assessment    Airway WDL WDL        Respiratory WDL    Respiratory WDL WDL;expansion/retractions;rhythm/pattern     Rhythm/Pattern, Respiratory unlabored;pattern regular;no shortness of breath reported;depth regular     Expansion/Accessory Muscles/Retractions no use of accessory muscles;no retractions;expansion symmetric        Peripheral/Neurovascular WDL    Peripheral Neurovascular WDL WDL                     "

## 2024-05-23 NOTE — ED NOTES
AVS reviewed, no questions.  Refused discharge vitals.    
Patient ambulated to ED Rm 7, gait was steady.  Denies falling or traumatic injury.  Denies neck pain, chest pain, ABD pain, SOB, headache.  CMS intact, NO numbness or tingling.     Patient reports use of ETOH yesterday.     Resting in position of comfort.   Call light within reach.    
n/a

## 2024-07-06 ENCOUNTER — HEALTH MAINTENANCE LETTER (OUTPATIENT)
Age: 79
End: 2024-07-06

## 2025-02-26 ENCOUNTER — MEDICAL CORRESPONDENCE (OUTPATIENT)
Dept: HEALTH INFORMATION MANAGEMENT | Facility: OTHER | Age: 80
End: 2025-02-26

## 2025-02-26 ENCOUNTER — TRANSFERRED RECORDS (OUTPATIENT)
Dept: HEALTH INFORMATION MANAGEMENT | Facility: OTHER | Age: 80
End: 2025-02-26

## 2025-07-13 ENCOUNTER — HEALTH MAINTENANCE LETTER (OUTPATIENT)
Age: 80
End: 2025-07-13

## (undated) DEVICE — BIN-LENS IMPLANT CART

## (undated) DEVICE — LABEL-STERILE PREPRINTED FOR OR

## (undated) DEVICE — PACK-PHACO STELLARIS

## (undated) DEVICE — DRSG-TEGADERM MEDIUM #1626

## (undated) DEVICE — HANDPIECE-CAPSULEGUARD I/A STELLARIS

## (undated) DEVICE — PACK-EYE-CUSTOM

## (undated) DEVICE — GLV-7.0 PROTEXIS PI CLASSIC LF/PF

## (undated) DEVICE — BIN-CATARACT BIN

## (undated) DEVICE — BETADINE 5% STERILE OPHTHALMIC SOLUTION 1 OZ.

## (undated) DEVICE — MALYUGIN 2.0 RING 6.25MM

## (undated) DEVICE — INSTRUMENT WIPE-VISIWIPE

## (undated) DEVICE — BIN-TECNIS DCB00 LENSES

## (undated) DEVICE — CYSTOTOME-IRRIGATING  25G

## (undated) DEVICE — GLV-7.5 PROTEXIS PI CLASSIC LF/PF

## (undated) DEVICE — KNIFE-LASEREDGE CLEAR CORNEAL 2.75MM ANGLED DOUBLE BEVEL

## (undated) DEVICE — KNIFE-MVR 20GA/45 DEGREE ANGLED

## (undated) DEVICE — SENSOR-OXISENSOR II ADULT